# Patient Record
Sex: FEMALE | Race: WHITE | NOT HISPANIC OR LATINO | Employment: OTHER | URBAN - METROPOLITAN AREA
[De-identification: names, ages, dates, MRNs, and addresses within clinical notes are randomized per-mention and may not be internally consistent; named-entity substitution may affect disease eponyms.]

---

## 2023-09-20 PROBLEM — E04.9 SUBSTERNAL THYROID GOITER: Status: ACTIVE | Noted: 2023-09-20

## 2023-09-20 PROBLEM — M65.341 TRIGGER RING FINGER OF RIGHT HAND: Status: ACTIVE | Noted: 2023-09-20

## 2023-09-20 PROBLEM — M54.16 LUMBAR RADICULOPATHY: Status: ACTIVE | Noted: 2023-09-20

## 2023-09-20 PROBLEM — M70.62 TROCHANTERIC BURSITIS OF LEFT HIP: Status: ACTIVE | Noted: 2023-09-20

## 2023-09-20 RX ORDER — ALLOPURINOL 100 MG/1
100 TABLET ORAL DAILY
COMMUNITY
Start: 2014-01-27 | End: 2024-05-29 | Stop reason: SDUPTHER

## 2023-09-20 RX ORDER — CANDESARTAN 16 MG/1
TABLET ORAL
COMMUNITY
Start: 2017-05-09 | End: 2024-03-11 | Stop reason: SDUPTHER

## 2023-09-20 RX ORDER — ACETAMINOPHEN 325 MG/1
650 TABLET ORAL EVERY 6 HOURS PRN
COMMUNITY
Start: 2018-07-25

## 2023-09-20 RX ORDER — LEVOTHYROXINE SODIUM 50 UG/1
TABLET ORAL
COMMUNITY
End: 2024-05-13 | Stop reason: SDUPTHER

## 2023-09-20 RX ORDER — NEBIVOLOL 5 MG/1
TABLET ORAL
COMMUNITY
Start: 2017-05-12

## 2023-09-20 RX ORDER — AMLODIPINE BESYLATE 5 MG/1
5 TABLET ORAL DAILY
COMMUNITY
Start: 2014-01-27 | End: 2024-05-29 | Stop reason: SDUPTHER

## 2023-09-20 RX ORDER — ERGOCALCIFEROL 1.25 MG/1
CAPSULE ORAL
COMMUNITY

## 2023-09-20 RX ORDER — EZETIMIBE 10 MG/1
TABLET ORAL
COMMUNITY
Start: 2014-01-27

## 2023-09-20 RX ORDER — EPINEPHRINE 0.22MG
AEROSOL WITH ADAPTER (ML) INHALATION
COMMUNITY

## 2023-09-20 RX ORDER — CELECOXIB 200 MG/1
CAPSULE ORAL
COMMUNITY
Start: 2021-09-01

## 2023-09-20 RX ORDER — UBIDECARENONE 75 MG
CAPSULE ORAL
COMMUNITY

## 2023-09-20 RX ORDER — CANDESARTAN 8 MG/1
8 TABLET ORAL DAILY
COMMUNITY
Start: 2014-01-27 | End: 2024-03-11 | Stop reason: WASHOUT

## 2023-09-20 RX ORDER — FOLIC ACID 0.8 MG
TABLET ORAL
COMMUNITY

## 2023-10-20 ENCOUNTER — OFFICE VISIT (OUTPATIENT)
Dept: ORTHOPEDIC SURGERY | Facility: CLINIC | Age: 88
End: 2023-10-20
Payer: MEDICARE

## 2023-10-20 DIAGNOSIS — M12.811 ROTATOR CUFF ARTHROPATHY OF RIGHT SHOULDER: Primary | ICD-10-CM

## 2023-10-20 DIAGNOSIS — M25.512 PAIN OF BOTH SHOULDER JOINTS: ICD-10-CM

## 2023-10-20 DIAGNOSIS — M25.511 PAIN OF BOTH SHOULDER JOINTS: ICD-10-CM

## 2023-10-20 PROCEDURE — 99213 OFFICE O/P EST LOW 20 MIN: CPT | Performed by: EMERGENCY MEDICINE

## 2023-10-20 RX ORDER — TRIAMCINOLONE ACETONIDE 40 MG/ML
80 INJECTION, SUSPENSION INTRA-ARTICULAR; INTRAMUSCULAR
Status: COMPLETED | OUTPATIENT
Start: 2023-10-20 | End: 2023-10-20

## 2023-10-20 RX ORDER — LIDOCAINE HYDROCHLORIDE 10 MG/ML
3 INJECTION INFILTRATION; PERINEURAL
Status: COMPLETED | OUTPATIENT
Start: 2023-10-20 | End: 2023-10-20

## 2023-10-20 RX ADMIN — LIDOCAINE HYDROCHLORIDE 3 ML: 10 INJECTION INFILTRATION; PERINEURAL at 12:05

## 2023-10-20 RX ADMIN — TRIAMCINOLONE ACETONIDE 80 MG: 40 INJECTION, SUSPENSION INTRA-ARTICULAR; INTRAMUSCULAR at 12:05

## 2023-10-20 NOTE — PROGRESS NOTES
Subjective   Dorothea Huang is a 88 y.o. female who presents for Follow-up and Pain of the Left Shoulder (Glenohumeral inj DOLI: 7/27/23//) and Follow-up and Pain of the Right Shoulder (Subacromial inj DOLI: 7/27/23/)    HPI  10/20/23: Patient returns today for bilateral shoulder pain.  During her last visit on 7/27/2023, we did perform a right subacromial injection and a left glenohumeral joint injection.  She feels that the right subacromial injection was quite beneficial for her up until recently.  She would like to have a repeat injection performed today.  Her left shoulder remains pain-free at this time.    7/27/23: Patient returns today for bilateral shoulder pain.  During her last visit on 5/8/2023, we performed a right subacromial injection.  She felt that these worked quite well for her up until recently.  She would like to have repeat injections performed today.  She denies any further injuries.  She has no additional complaints this time.    5/8/23: Patient returns today for bilateral shoulder pain.  We did perform a right shoulder subacromial injection on 1/23/2023 that she felt worked better for her right shoulder than the glenohumeral joint injection.  She states the left shoulder glenohumeral joint injection she received on 1/9/2023 worked quite well up until recently.  She would like to have both shoulders injected today.    1/23/23: Patient returns today for right shoulder pain.  We did perform bilateral shoulder glenohumeral joint injections on 1/9/2023.  She feels that her left shoulder is doing well.  However, she continues to have superior shoulder pain.  This pain is exacerbated by laying on her shoulder, eating objects, and overhead activities.  She would like to discuss potential further treatment options.    1/9/23: Patient turns today for bilateral shoulder pain.  She states the previous injections she received on 10/17/2022 worked quite well up until recently.  She presents today  requesting repeat injections.  She denies any further injuries.  She has no additional complaints at this time.    10/17/22: Patient returns today for bilateral shoulder pain.  She states the previous injections she received on 7/14/2022 worked quite well for her up until recently.  She presents today requesting repeat injections.  She denies any additional injuries.    7/14/22: Patient returns today for reevaluation of right shoulder pain, as well as new complaint of left shoulder pain.  During her last visit on 2/14/2022, we did perform a right shoulder glenohumeral injection which worked quite well for her up until recently.  She would like to have a repeat injection today.  Furthermore, she is also developing worsening left shoulder pain for the past few weeks as well.  She did have left shoulder injection performed on 11/19/2021.  She would like a left shoulder injection today.    2/14/22: 86-year-old female presents today with complaint of generalized and superior right shoulder pain that she has had for right sometime, however exacerbated after an injury in April 2021.  She has been having therapeutic corticosteroid injections with Dr. Gutierrez.  She presents today requesting a repeat injection.  She denies any further injuries.  ROS: All pertinent positive symptoms are included in the history of present illness.    All other systems have been reviewed and are negative and noncontributory to this patient's current ailments.    Objective     There were no vitals filed for this visit.    Physical Exam    General/Constitutional: No apparent distress. Well-nourished and well developed.  Head: Normocephalic, Atraumatic.   Eyes: EOMI.  Vascular: No edema, swelling or tenderness, except as noted in detailed exam.  Respiratory: Non-labored breathing.  Integumentary: No impressive skin lesions present, except as noted in detailed exam.  Neurological: Alert and oriented.  Psychological:  Normal mood and  affect.  Musculoskeletal: Normal, except as noted in detailed exam.  Right shoulder: No rash.  No deformity.  No erythema.  Generalized pain with passive range of motion throughout all planes.  Positive empty can.  Positive Neer.  Muscle strength 4 out of 5 with forward flexion and external rotation.    Patient ID: Dorothea Huang is a 88 y.o. female.    M Inj/Asp on 10/20/2023 12:05 PM  Indications: pain  Details: 25 G needle, ultrasound-guided lateral approach  Medications: 80 mg triamcinolone acetonide 40 mg/mL; 3 mL lidocaine 10 mg/mL (1 %)  Outcome: tolerated well, no immediate complications  Procedure, treatment alternatives, risks and benefits explained, specific risks discussed. Consent was given by the patient. Immediately prior to procedure a time out was called to verify the correct patient, procedure, equipment, support staff and site/side marked as required. Patient was prepped and draped in the usual sterile fashion.           Assessment/Plan   Problem List Items Addressed This Visit    None  Visit Diagnoses       Rotator cuff arthropathy of right shoulder    -  Primary    Pain of both shoulder joints                Discussed risks versus benefits of having injection performed. Patient has elected to proceed with procedure. Please refer to procedure note above. Discussed with patient to limit overhead and lifting activities over the next 24-48 hours, they can then progress to full activities as tolerated. Discussed with patient to avoid water submersion over the next 2 days. Discussed with patient to call me immediately if they develop worsening pain, rash, erythema, or fevers. Patient should follow-up as needed if pain persists or worsens.    Oliverio Valdez, DO  Sports Medicine  [facility]     ** Please excuse any errors in grammar or translation related to this dictation. Voice recognition software was utilized to prepare this document. **

## 2023-12-04 ENCOUNTER — LAB (OUTPATIENT)
Dept: LAB | Facility: LAB | Age: 88
End: 2023-12-04
Payer: MEDICARE

## 2023-12-04 DIAGNOSIS — R73.09 ELEVATED GLUCOSE: ICD-10-CM

## 2023-12-04 DIAGNOSIS — E55.9 VITAMIN D DEFICIENCY: ICD-10-CM

## 2023-12-04 DIAGNOSIS — N18.9 CHRONIC KIDNEY DISEASE, UNSPECIFIED CKD STAGE: ICD-10-CM

## 2023-12-04 DIAGNOSIS — R82.998 LEUKOCYTES IN URINE: ICD-10-CM

## 2023-12-04 DIAGNOSIS — E03.9 HYPOTHYROIDISM, UNSPECIFIED TYPE: ICD-10-CM

## 2023-12-04 DIAGNOSIS — I10 ESSENTIAL HYPERTENSION, BENIGN: ICD-10-CM

## 2023-12-04 DIAGNOSIS — E78.5 HYPERLIPIDEMIA, UNSPECIFIED HYPERLIPIDEMIA TYPE: ICD-10-CM

## 2023-12-04 LAB
25(OH)D3 SERPL-MCNC: 45 NG/ML (ref 30–100)
ALBUMIN SERPL BCP-MCNC: 4.1 G/DL (ref 3.4–5)
ALP SERPL-CCNC: 49 U/L (ref 33–136)
ALT SERPL W P-5'-P-CCNC: 19 U/L (ref 7–45)
ANION GAP SERPL CALC-SCNC: 15 MMOL/L (ref 10–20)
APPEARANCE UR: CLEAR
AST SERPL W P-5'-P-CCNC: 19 U/L (ref 9–39)
BILIRUB SERPL-MCNC: 0.7 MG/DL (ref 0–1.2)
BILIRUB UR STRIP.AUTO-MCNC: NEGATIVE MG/DL
BUN SERPL-MCNC: 29 MG/DL (ref 6–23)
CALCIUM SERPL-MCNC: 9.5 MG/DL (ref 8.6–10.6)
CHLORIDE SERPL-SCNC: 105 MMOL/L (ref 98–107)
CHOLEST SERPL-MCNC: 176 MG/DL (ref 0–199)
CHOLESTEROL/HDL RATIO: 2.9
CO2 SERPL-SCNC: 23 MMOL/L (ref 21–32)
COLOR UR: YELLOW
CREAT SERPL-MCNC: 1.31 MG/DL (ref 0.5–1.05)
ERYTHROCYTE [DISTWIDTH] IN BLOOD BY AUTOMATED COUNT: 14 % (ref 11.5–14.5)
EST. AVERAGE GLUCOSE BLD GHB EST-MCNC: 117 MG/DL
GFR SERPL CREATININE-BSD FRML MDRD: 40 ML/MIN/1.73M*2
GLUCOSE SERPL-MCNC: 109 MG/DL (ref 74–99)
GLUCOSE UR STRIP.AUTO-MCNC: NEGATIVE MG/DL
HBA1C MFR BLD: 5.7 %
HCT VFR BLD AUTO: 40.4 % (ref 36–46)
HDLC SERPL-MCNC: 60.2 MG/DL
HGB BLD-MCNC: 12.9 G/DL (ref 12–16)
HYALINE CASTS #/AREA URNS AUTO: ABNORMAL /LPF
KETONES UR STRIP.AUTO-MCNC: NEGATIVE MG/DL
LDLC SERPL CALC-MCNC: 95 MG/DL
LEUKOCYTE ESTERASE UR QL STRIP.AUTO: ABNORMAL
MCH RBC QN AUTO: 32.6 PG (ref 26–34)
MCHC RBC AUTO-ENTMCNC: 31.9 G/DL (ref 32–36)
MCV RBC AUTO: 102 FL (ref 80–100)
NITRITE UR QL STRIP.AUTO: NEGATIVE
NON HDL CHOLESTEROL: 116 MG/DL (ref 0–149)
NRBC BLD-RTO: 0 /100 WBCS (ref 0–0)
PH UR STRIP.AUTO: 5 [PH]
PLATELET # BLD AUTO: 211 X10*3/UL (ref 150–450)
POTASSIUM SERPL-SCNC: 4.5 MMOL/L (ref 3.5–5.3)
PROT SERPL-MCNC: 6.4 G/DL (ref 6.4–8.2)
PROT UR STRIP.AUTO-MCNC: NEGATIVE MG/DL
RBC # BLD AUTO: 3.96 X10*6/UL (ref 4–5.2)
RBC # UR STRIP.AUTO: NEGATIVE /UL
RBC #/AREA URNS AUTO: ABNORMAL /HPF
SODIUM SERPL-SCNC: 138 MMOL/L (ref 136–145)
SP GR UR STRIP.AUTO: 1.02
SQUAMOUS #/AREA URNS AUTO: ABNORMAL /HPF
T4 FREE SERPL-MCNC: 1.24 NG/DL (ref 0.78–1.48)
TRIGL SERPL-MCNC: 103 MG/DL (ref 0–149)
TSH SERPL-ACNC: 2.29 MIU/L (ref 0.44–3.98)
URATE SERPL-MCNC: 4.2 MG/DL (ref 2.3–6.7)
UROBILINOGEN UR STRIP.AUTO-MCNC: <2 MG/DL
VLDL: 21 MG/DL (ref 0–40)
WBC # BLD AUTO: 6.8 X10*3/UL (ref 4.4–11.3)
WBC #/AREA URNS AUTO: ABNORMAL /HPF

## 2023-12-04 PROCEDURE — 84550 ASSAY OF BLOOD/URIC ACID: CPT

## 2023-12-04 PROCEDURE — 84439 ASSAY OF FREE THYROXINE: CPT

## 2023-12-04 PROCEDURE — 80061 LIPID PANEL: CPT

## 2023-12-04 PROCEDURE — 85027 COMPLETE CBC AUTOMATED: CPT

## 2023-12-04 PROCEDURE — 83036 HEMOGLOBIN GLYCOSYLATED A1C: CPT

## 2023-12-04 PROCEDURE — 82306 VITAMIN D 25 HYDROXY: CPT

## 2023-12-04 PROCEDURE — 81001 URINALYSIS AUTO W/SCOPE: CPT

## 2023-12-04 PROCEDURE — 87086 URINE CULTURE/COLONY COUNT: CPT

## 2023-12-04 PROCEDURE — 80053 COMPREHEN METABOLIC PANEL: CPT

## 2023-12-04 PROCEDURE — 36415 COLL VENOUS BLD VENIPUNCTURE: CPT

## 2023-12-04 PROCEDURE — 84443 ASSAY THYROID STIM HORMONE: CPT

## 2023-12-05 LAB — BACTERIA UR CULT: NORMAL

## 2024-01-02 DIAGNOSIS — K59.00 CONSTIPATION, UNSPECIFIED CONSTIPATION TYPE: Primary | ICD-10-CM

## 2024-01-11 ENCOUNTER — APPOINTMENT (OUTPATIENT)
Dept: ORTHOPEDIC SURGERY | Facility: HOSPITAL | Age: 89
End: 2024-01-11
Payer: MEDICARE

## 2024-01-18 ENCOUNTER — OFFICE VISIT (OUTPATIENT)
Dept: ORTHOPEDIC SURGERY | Facility: HOSPITAL | Age: 89
End: 2024-01-18
Payer: MEDICARE

## 2024-01-18 DIAGNOSIS — M25.511 PAIN OF BOTH SHOULDER JOINTS: Primary | ICD-10-CM

## 2024-01-18 DIAGNOSIS — M25.512 PAIN OF BOTH SHOULDER JOINTS: Primary | ICD-10-CM

## 2024-01-18 PROCEDURE — 2500000005 HC RX 250 GENERAL PHARMACY W/O HCPCS: Performed by: EMERGENCY MEDICINE

## 2024-01-18 PROCEDURE — 99213 OFFICE O/P EST LOW 20 MIN: CPT | Performed by: EMERGENCY MEDICINE

## 2024-01-18 PROCEDURE — 20611 DRAIN/INJ JOINT/BURSA W/US: CPT | Performed by: EMERGENCY MEDICINE

## 2024-01-18 PROCEDURE — 2500000004 HC RX 250 GENERAL PHARMACY W/ HCPCS (ALT 636 FOR OP/ED): Performed by: EMERGENCY MEDICINE

## 2024-01-18 RX ORDER — LIDOCAINE HYDROCHLORIDE 10 MG/ML
3 INJECTION INFILTRATION; PERINEURAL
Status: COMPLETED | OUTPATIENT
Start: 2024-01-18 | End: 2024-01-18

## 2024-01-18 RX ORDER — TRIAMCINOLONE ACETONIDE 40 MG/ML
40 INJECTION, SUSPENSION INTRA-ARTICULAR; INTRAMUSCULAR
Status: COMPLETED | OUTPATIENT
Start: 2024-01-18 | End: 2024-01-18

## 2024-01-18 RX ADMIN — LIDOCAINE HYDROCHLORIDE 3 ML: 10 INJECTION, SOLUTION INFILTRATION; PERINEURAL at 14:58

## 2024-01-18 RX ADMIN — TRIAMCINOLONE ACETONIDE 40 MG: 200 INJECTION, SUSPENSION INTRA-ARTICULAR; INTRAMUSCULAR at 14:58

## 2024-01-18 NOTE — PROGRESS NOTES
Subjective   Dorothea Huang is a 88 y.o. female who presents for Follow-up and Pain of the Left Shoulder and Follow-up and Pain of the Right Shoulder    Left Shoulder     Right Shoulder       1/18/24: Patient returns today for bilateral shoulder pain.  Her last left shoulder glenohumeral joint injection was performed on 7/27/2023 and her last right shoulder subacromial injection was performed on 10/20/2023.  She felt that the subacromial injection did not provide significant relief.    10/20/23: Patient returns today for bilateral shoulder pain.  During her last visit on 7/27/2023, we did perform a right subacromial injection and a left glenohumeral joint injection.  She feels that the right subacromial injection was quite beneficial for her up until recently.  She would like to have a repeat injection performed today.  Her left shoulder remains pain-free at this time.    7/27/23: Patient returns today for bilateral shoulder pain.  During her last visit on 5/8/2023, we performed a right subacromial injection.  She felt that these worked quite well for her up until recently.  She would like to have repeat injections performed today.  She denies any further injuries.  She has no additional complaints this time.    5/8/23: Patient returns today for bilateral shoulder pain.  We did perform a right shoulder subacromial injection on 1/23/2023 that she felt worked better for her right shoulder than the glenohumeral joint injection.  She states the left shoulder glenohumeral joint injection she received on 1/9/2023 worked quite well up until recently.  She would like to have both shoulders injected today.    1/23/23: Patient returns today for right shoulder pain.  We did perform bilateral shoulder glenohumeral joint injections on 1/9/2023.  She feels that her left shoulder is doing well.  However, she continues to have superior shoulder pain.  This pain is exacerbated by laying on her shoulder, eating objects, and overhead  activities.  She would like to discuss potential further treatment options.    1/9/23: Patient turns today for bilateral shoulder pain.  She states the previous injections she received on 10/17/2022 worked quite well up until recently.  She presents today requesting repeat injections.  She denies any further injuries.  She has no additional complaints at this time.    10/17/22: Patient returns today for bilateral shoulder pain.  She states the previous injections she received on 7/14/2022 worked quite well for her up until recently.  She presents today requesting repeat injections.  She denies any additional injuries.    7/14/22: Patient returns today for reevaluation of right shoulder pain, as well as new complaint of left shoulder pain.  During her last visit on 2/14/2022, we did perform a right shoulder glenohumeral injection which worked quite well for her up until recently.  She would like to have a repeat injection today.  Furthermore, she is also developing worsening left shoulder pain for the past few weeks as well.  She did have left shoulder injection performed on 11/19/2021.  She would like a left shoulder injection today.    2/14/22: 86-year-old female presents today with complaint of generalized and superior right shoulder pain that she has had for right sometime, however exacerbated after an injury in April 2021.  She has been having therapeutic corticosteroid injections with Dr. Gutierrez.  She presents today requesting a repeat injection.  She denies any further injuries.  ROS: All pertinent positive symptoms are included in the history of present illness.    All other systems have been reviewed and are negative and noncontributory to this patient's current ailments.    Objective     There were no vitals filed for this visit.    Physical Exam    General/Constitutional: No apparent distress. Well-nourished and well developed.  Head: Normocephalic, Atraumatic.   Eyes: EOMI.  Vascular: No edema,  swelling or tenderness, except as noted in detailed exam.  Respiratory: Non-labored breathing.  Integumentary: No impressive skin lesions present, except as noted in detailed exam.  Neurological: Alert and oriented.  Psychological:  Normal mood and affect.  Musculoskeletal: Normal, except as noted in detailed exam.  Right shoulder: No rash.  No deformity.  No erythema.  Generalized pain with passive range of motion throughout all planes.  Positive empty can.  Positive Neer.  Muscle strength 4 out of 5 with forward flexion and external rotation.  Left shoulder: No rash. No deformity. No erythema. Generalized pain with passive range of motion throughout all planes.       Patient ID: Dorothea Huang is a 88 y.o. female.    L Inj/Asp: R glenohumeral on 1/18/2024 2:58 PM  Indications: pain  Details: 25 G needle, ultrasound-guided posterior approach  Medications: 40 mg triamcinolone acetonide 40 mg/mL; 3 mL lidocaine 10 mg/mL (1 %)  Outcome: tolerated well, no immediate complications  Procedure, treatment alternatives, risks and benefits explained, specific risks discussed. Consent was given by the patient. Immediately prior to procedure a time out was called to verify the correct patient, procedure, equipment, support staff and site/side marked as required. Patient was prepped and draped in the usual sterile fashion.       L Inj/Asp: L glenohumeral on 1/18/2024 2:58 PM  Indications: pain  Details: 25 G needle, ultrasound-guided posterior approach  Medications: 40 mg triamcinolone acetonide 40 mg/mL; 3 mL lidocaine 10 mg/mL (1 %)  Outcome: tolerated well, no immediate complications  Procedure, treatment alternatives, risks and benefits explained, specific risks discussed. Consent was given by the patient. Immediately prior to procedure a time out was called to verify the correct patient, procedure, equipment, support staff and site/side marked as required. Patient was prepped and draped in the usual sterile fashion.            Assessment/Plan   Problem List Items Addressed This Visit    None  Visit Diagnoses       Pain of both shoulder joints        Relevant Orders    Point of Care Ultrasound (Completed)            Considering that she did not have long-lasting relief from the subacromial injection, I discussed with patient that we can also perform a glenohumeral joint injection for the right shoulder as well as left shoulder.  She is in agreement.  Discussed risks versus benefits of having injections performed. Patient has elected to proceed with procedures. Please refer to procedure notes above. Discussed with patient to limit overhead and lifting activities over the next 24-48 hours, they can then progress to full activities as tolerated. Discussed with patient to avoid water submersion over the next 2 days. Discussed with patient to call me immediately if they develop worsening pain, rash, erythema, or fevers. Patient should follow-up as needed if pain persist or worsens.      Oliverio Valdez, DO  Sports Medicine  [facility]     ** Please excuse any errors in grammar or translation related to this dictation. Voice recognition software was utilized to prepare this document. **

## 2024-02-13 DIAGNOSIS — F41.9 ANXIETY: Primary | ICD-10-CM

## 2024-02-13 RX ORDER — ESCITALOPRAM OXALATE 10 MG/1
10 TABLET ORAL DAILY
Qty: 30 TABLET | Refills: 2 | Status: SHIPPED | OUTPATIENT
Start: 2024-02-13 | End: 2024-05-29 | Stop reason: SDUPTHER

## 2024-03-11 DIAGNOSIS — I10 HYPERTENSION, UNSPECIFIED TYPE: Primary | ICD-10-CM

## 2024-03-11 RX ORDER — CANDESARTAN 16 MG/1
16 TABLET ORAL 2 TIMES DAILY
Qty: 180 TABLET | Refills: 3 | Status: SHIPPED | OUTPATIENT
Start: 2024-03-11

## 2024-04-12 ENCOUNTER — OFFICE VISIT (OUTPATIENT)
Dept: ORTHOPEDIC SURGERY | Facility: CLINIC | Age: 89
End: 2024-04-12
Payer: MEDICARE

## 2024-04-12 ENCOUNTER — HOSPITAL ENCOUNTER (OUTPATIENT)
Dept: RADIOLOGY | Facility: EXTERNAL LOCATION | Age: 89
Discharge: HOME | End: 2024-04-12

## 2024-04-12 DIAGNOSIS — M25.511 PAIN OF BOTH SHOULDER JOINTS: Primary | ICD-10-CM

## 2024-04-12 DIAGNOSIS — M25.512 PAIN OF BOTH SHOULDER JOINTS: Primary | ICD-10-CM

## 2024-04-12 PROCEDURE — 99214 OFFICE O/P EST MOD 30 MIN: CPT | Performed by: EMERGENCY MEDICINE

## 2024-04-12 PROCEDURE — 20611 DRAIN/INJ JOINT/BURSA W/US: CPT | Performed by: EMERGENCY MEDICINE

## 2024-04-12 RX ORDER — LIDOCAINE HYDROCHLORIDE 10 MG/ML
3 INJECTION INFILTRATION; PERINEURAL
Status: COMPLETED | OUTPATIENT
Start: 2024-04-12 | End: 2024-04-12

## 2024-04-12 RX ORDER — TRIAMCINOLONE ACETONIDE 40 MG/ML
40 INJECTION, SUSPENSION INTRA-ARTICULAR; INTRAMUSCULAR
Status: COMPLETED | OUTPATIENT
Start: 2024-04-12 | End: 2024-04-12

## 2024-04-12 RX ADMIN — LIDOCAINE HYDROCHLORIDE 3 ML: 10 INJECTION INFILTRATION; PERINEURAL at 11:36

## 2024-04-12 RX ADMIN — TRIAMCINOLONE ACETONIDE 40 MG: 40 INJECTION, SUSPENSION INTRA-ARTICULAR; INTRAMUSCULAR at 11:36

## 2024-04-12 RX ADMIN — LIDOCAINE HYDROCHLORIDE 3 ML: 10 INJECTION INFILTRATION; PERINEURAL at 11:35

## 2024-04-12 RX ADMIN — TRIAMCINOLONE ACETONIDE 40 MG: 40 INJECTION, SUSPENSION INTRA-ARTICULAR; INTRAMUSCULAR at 11:35

## 2024-04-12 NOTE — PROGRESS NOTES
Subjective   Dorothea Huang is a 88 y.o. female who presents for Follow-up and Pain of the Left Shoulder and Follow-up and Pain of the Right Shoulder    Left Shoulder     Right Shoulder       4/12/24: Patient returns today for bilateral shoulder pain.  We did perform bilateral shoulder glenohumeral joint injections for her on 1/18/2024.  She felt they worked well and her pain has since returned and has become progressively worse.  Considering this, she would like to have repeat injections performed today.  No additional complaints.    1/18/24: Patient returns today for bilateral shoulder pain.  Her last left shoulder glenohumeral joint injection was performed on 7/27/2023 and her last right shoulder subacromial injection was performed on 10/20/2023.  She felt that the subacromial injection did not provide significant relief.    10/20/23: Patient returns today for bilateral shoulder pain.  During her last visit on 7/27/2023, we did perform a right subacromial injection and a left glenohumeral joint injection.  She feels that the right subacromial injection was quite beneficial for her up until recently.  She would like to have a repeat injection performed today.  Her left shoulder remains pain-free at this time.    7/27/23: Patient returns today for bilateral shoulder pain.  During her last visit on 5/8/2023, we performed a right subacromial injection.  She felt that these worked quite well for her up until recently.  She would like to have repeat injections performed today.  She denies any further injuries.  She has no additional complaints this time.    5/8/23: Patient returns today for bilateral shoulder pain.  We did perform a right shoulder subacromial injection on 1/23/2023 that she felt worked better for her right shoulder than the glenohumeral joint injection.  She states the left shoulder glenohumeral joint injection she received on 1/9/2023 worked quite well up until recently.  She would like to have both  shoulders injected today.    1/23/23: Patient returns today for right shoulder pain.  We did perform bilateral shoulder glenohumeral joint injections on 1/9/2023.  She feels that her left shoulder is doing well.  However, she continues to have superior shoulder pain.  This pain is exacerbated by laying on her shoulder, eating objects, and overhead activities.  She would like to discuss potential further treatment options.    1/9/23: Patient turns today for bilateral shoulder pain.  She states the previous injections she received on 10/17/2022 worked quite well up until recently.  She presents today requesting repeat injections.  She denies any further injuries.  She has no additional complaints at this time.    10/17/22: Patient returns today for bilateral shoulder pain.  She states the previous injections she received on 7/14/2022 worked quite well for her up until recently.  She presents today requesting repeat injections.  She denies any additional injuries.    7/14/22: Patient returns today for reevaluation of right shoulder pain, as well as new complaint of left shoulder pain.  During her last visit on 2/14/2022, we did perform a right shoulder glenohumeral injection which worked quite well for her up until recently.  She would like to have a repeat injection today.  Furthermore, she is also developing worsening left shoulder pain for the past few weeks as well.  She did have left shoulder injection performed on 11/19/2021.  She would like a left shoulder injection today.    2/14/22: 86-year-old female presents today with complaint of generalized and superior right shoulder pain that she has had for right sometime, however exacerbated after an injury in April 2021.  She has been having therapeutic corticosteroid injections with Dr. Gutierrez.  She presents today requesting a repeat injection.  She denies any further injuries.  ROS: All pertinent positive symptoms are included in the history of present  illness.    All other systems have been reviewed and are negative and noncontributory to this patient's current ailments.    Objective     There were no vitals filed for this visit.    Physical Exam    General/Constitutional: No apparent distress. Well-nourished and well developed.  Head: Normocephalic, Atraumatic.   Eyes: EOMI.  Vascular: No edema, swelling or tenderness, except as noted in detailed exam.  Respiratory: Non-labored breathing.  Integumentary: No impressive skin lesions present, except as noted in detailed exam.  Neurological: Alert and oriented.  Psychological:  Normal mood and affect.  Musculoskeletal: Normal, except as noted in detailed exam.  Right shoulder: No rash.  No deformity.  No erythema.  Generalized pain with passive range of motion throughout all planes.  Positive empty can.  Positive Neer.  Muscle strength 4 out of 5 with forward flexion and external rotation.  Left shoulder: No rash. No deformity. No erythema. Generalized pain with passive range of motion throughout all planes.       Patient ID: Dorothea Huang is a 88 y.o. female.    L Inj/Asp: R glenohumeral on 4/12/2024 11:35 AM  Indications: pain  Details: 25 G needle, ultrasound-guided posterior approach  Medications: 40 mg triamcinolone acetonide 40 mg/mL; 3 mL lidocaine 10 mg/mL (1 %)  Outcome: tolerated well, no immediate complications  Procedure, treatment alternatives, risks and benefits explained, specific risks discussed. Consent was given by the patient. Immediately prior to procedure a time out was called to verify the correct patient, procedure, equipment, support staff and site/side marked as required. Patient was prepped and draped in the usual sterile fashion.       L Inj/Asp: L glenohumeral on 4/12/2024 11:36 AM  Indications: pain  Details: 25 G needle, ultrasound-guided posterior approach  Medications: 40 mg triamcinolone acetonide 40 mg/mL; 3 mL lidocaine 10 mg/mL (1 %)  Outcome: tolerated well, no immediate  complications  Procedure, treatment alternatives, risks and benefits explained, specific risks discussed. Consent was given by the patient. Immediately prior to procedure a time out was called to verify the correct patient, procedure, equipment, support staff and site/side marked as required. Patient was prepped and draped in the usual sterile fashion.           Assessment/Plan   Problem List Items Addressed This Visit    None  Visit Diagnoses       Pain of both shoulder joints        Relevant Orders    Point of Care Ultrasound (Completed)            Considering that she did well with previous injections, I do feel that repeat injections are warranted today.  Discussed risks versus benefits of having injections performed. Patient has elected to proceed with procedures. Please refer to procedure notes above. Discussed with patient to limit overhead and lifting activities over the next 24-48 hours, they can then progress to full activities as tolerated. Discussed with patient to avoid water submersion over the next 2 days. Discussed with patient to call me immediately if they develop worsening pain, rash, erythema, or fevers. Patient should follow-up as needed if pain persist or worsens.      Oliverio Valdez, DO  Sports Medicine  [facility]     ** Please excuse any errors in grammar or translation related to this dictation. Voice recognition software was utilized to prepare this document. **

## 2024-04-29 DIAGNOSIS — I10 HYPERTENSION, UNSPECIFIED TYPE: Primary | ICD-10-CM

## 2024-04-29 RX ORDER — METOPROLOL SUCCINATE 50 MG/1
50 TABLET, EXTENDED RELEASE ORAL DAILY
Qty: 90 TABLET | Refills: 3 | Status: SHIPPED | OUTPATIENT
Start: 2024-04-29

## 2024-04-29 RX ORDER — METOPROLOL SUCCINATE 50 MG/1
50 TABLET, EXTENDED RELEASE ORAL DAILY
COMMUNITY
End: 2024-04-29 | Stop reason: SDUPTHER

## 2024-05-13 DIAGNOSIS — E05.90 HYPERTHYROIDISM: Primary | ICD-10-CM

## 2024-05-13 RX ORDER — LEVOTHYROXINE SODIUM 50 UG/1
50 TABLET ORAL
Qty: 90 TABLET | Refills: 3 | Status: SHIPPED | OUTPATIENT
Start: 2024-05-13

## 2024-05-29 DIAGNOSIS — I27.82 CHRONIC PULMONARY EMBOLISM, UNSPECIFIED PULMONARY EMBOLISM TYPE, UNSPECIFIED WHETHER ACUTE COR PULMONALE PRESENT (MULTI): ICD-10-CM

## 2024-05-29 DIAGNOSIS — I10 HYPERTENSION, UNSPECIFIED TYPE: ICD-10-CM

## 2024-05-29 DIAGNOSIS — F41.9 ANXIETY: ICD-10-CM

## 2024-05-29 DIAGNOSIS — M10.9 GOUT, UNSPECIFIED CAUSE, UNSPECIFIED CHRONICITY, UNSPECIFIED SITE: Primary | ICD-10-CM

## 2024-05-29 RX ORDER — AMLODIPINE BESYLATE 5 MG/1
5 TABLET ORAL 2 TIMES DAILY
Qty: 180 TABLET | Refills: 3 | Status: SHIPPED | OUTPATIENT
Start: 2024-05-29

## 2024-05-29 RX ORDER — ALLOPURINOL 100 MG/1
100 TABLET ORAL 2 TIMES DAILY
Qty: 180 TABLET | Refills: 3 | Status: SHIPPED | OUTPATIENT
Start: 2024-05-29

## 2024-05-29 RX ORDER — ESCITALOPRAM OXALATE 10 MG/1
10 TABLET ORAL DAILY
Qty: 90 TABLET | Refills: 3 | Status: SHIPPED | OUTPATIENT
Start: 2024-05-29 | End: 2025-05-24

## 2024-06-24 ENCOUNTER — APPOINTMENT (OUTPATIENT)
Dept: ORTHOPEDIC SURGERY | Facility: CLINIC | Age: 89
End: 2024-06-24
Payer: MEDICARE

## 2024-06-24 VITALS — HEIGHT: 60 IN | BODY MASS INDEX: 45.94 KG/M2 | WEIGHT: 234 LBS

## 2024-06-24 DIAGNOSIS — M65.30 TRIGGER FINGER, ACQUIRED: Primary | ICD-10-CM

## 2024-06-24 RX ORDER — LIDOCAINE HYDROCHLORIDE 10 MG/ML
0.5 INJECTION INFILTRATION; PERINEURAL
Status: COMPLETED | OUTPATIENT
Start: 2024-06-24 | End: 2024-06-24

## 2024-06-24 RX ORDER — TRIAMCINOLONE ACETONIDE 40 MG/ML
20 INJECTION, SUSPENSION INTRA-ARTICULAR; INTRAMUSCULAR
Status: COMPLETED | OUTPATIENT
Start: 2024-06-24 | End: 2024-06-24

## 2024-06-24 ASSESSMENT — PAIN DESCRIPTION - DESCRIPTORS: DESCRIPTORS: SORE

## 2024-06-24 ASSESSMENT — PAIN SCALES - GENERAL: PAINLEVEL_OUTOF10: 3

## 2024-06-24 ASSESSMENT — PAIN - FUNCTIONAL ASSESSMENT: PAIN_FUNCTIONAL_ASSESSMENT: 0-10

## 2024-06-24 NOTE — PROGRESS NOTES
Dorothea returns to clinic today for recurrent triggering of her right middle finger.  Juan Miguel was seen last year for the same issue at that time given a steroid injection and did very well with it.  She is not interested in any surgery at this time requesting a repeat injection.  Has noticed recurrence of her symptoms over the last few weeks.    Patient's self reported past medical history, medications, allergies, surgical history, family and social history as well as a 10 point review of systems has been documented in the new patient intake form and scanned into the patient's electronic medical record. Pertinent findings are documented in the HPI.    Physical Examination Findings:  Constitutional: Appears well-developed and well-nourished.  Head: Normocephalic and atraumatic.  Eyes: Pupils are equal and round.  Cardiovascular: Intact distal pulses.   Respiratory: Effort normal. No respiratory distress.  Neurologic: Alert and oriented to person, place, and time.  Skin: Skin is warm and dry.  Hematologic / Lymphatic: No lymphedema, lymphangitis.  Psychiatric: normal mood and affect. Behavior is normal.   Musculoskeletal: Pain with palpation over the A1 pulley of the right middle finger.  She does not want to reproduce any triggering given the discomfort.  Fingers are warm and well-perfused.    Impression: Right middle finger trigger finger    Plan: Patient is aware of this condition as well as treatment options she is requesting a repeat steroid injection which was performed for her today.  She will continue to monitor symptoms she is not interested in surgical intervention.  She will follow-up with our office as needed.    Patient ID: Dorothea Huang is a 89 y.o. female.    Hand / UE Inj/Asp: R long A1 for trigger finger on 6/24/2024 11:21 AM  Details: 25 G needle  Medications: 20 mg triamcinolone acetonide 40 mg/mL; 0.5 mL lidocaine 10 mg/mL (1 %)  Procedure, treatment alternatives, risks and benefits explained,  specific risks discussed. Immediately prior to procedure a time out was called to verify the correct patient, procedure, equipment, support staff and site/side marked as required.         Cecilia Velazquez PA-C  Department of Orthopaedic Surgery  [facility]    Dictation performed with the use of voice recognition software. Syntax and grammatical errors may exist.

## 2024-07-19 ENCOUNTER — APPOINTMENT (OUTPATIENT)
Dept: ORTHOPEDIC SURGERY | Facility: CLINIC | Age: 89
End: 2024-07-19
Payer: MEDICARE

## 2024-07-19 ENCOUNTER — HOSPITAL ENCOUNTER (OUTPATIENT)
Dept: RADIOLOGY | Facility: EXTERNAL LOCATION | Age: 89
Discharge: HOME | End: 2024-07-19

## 2024-07-19 DIAGNOSIS — M25.511 PAIN OF BOTH SHOULDER JOINTS: Primary | ICD-10-CM

## 2024-07-19 DIAGNOSIS — M25.512 PAIN OF BOTH SHOULDER JOINTS: Primary | ICD-10-CM

## 2024-07-19 PROCEDURE — 99214 OFFICE O/P EST MOD 30 MIN: CPT | Performed by: EMERGENCY MEDICINE

## 2024-07-19 PROCEDURE — 20610 DRAIN/INJ JOINT/BURSA W/O US: CPT | Performed by: EMERGENCY MEDICINE

## 2024-07-19 RX ORDER — TRIAMCINOLONE ACETONIDE 40 MG/ML
40 INJECTION, SUSPENSION INTRA-ARTICULAR; INTRAMUSCULAR
Status: COMPLETED | OUTPATIENT
Start: 2024-07-19 | End: 2024-07-19

## 2024-07-19 RX ORDER — LIDOCAINE HYDROCHLORIDE 10 MG/ML
3 INJECTION INFILTRATION; PERINEURAL
Status: COMPLETED | OUTPATIENT
Start: 2024-07-19 | End: 2024-07-19

## 2024-07-19 NOTE — PROGRESS NOTES
Subjective   Dorothea Huang is a 89 y.o. female who presents for Follow-up of the Left Shoulder and Follow-up of the Right Shoulder    Left Shoulder     Right Shoulder     7/19/24: Patient returns today for bilateral shoulder pain.  We did perform bilateral shoulder glenohumeral joint injections for her on 4/12/24.  She felt they worked well and her pain has since returned and has become progressively worse.  Considering this, she would like to have repeat injections performed today.  No additional complaints.    4/12/24: Patient returns today for bilateral shoulder pain.  We did perform bilateral shoulder glenohumeral joint injections for her on 1/18/2024.  She felt they worked well and her pain has since returned and has become progressively worse.  Considering this, she would like to have repeat injections performed today.  No additional complaints.    1/18/24: Patient returns today for bilateral shoulder pain.  Her last left shoulder glenohumeral joint injection was performed on 7/27/2023 and her last right shoulder subacromial injection was performed on 10/20/2023.  She felt that the subacromial injection did not provide significant relief.    10/20/23: Patient returns today for bilateral shoulder pain.  During her last visit on 7/27/2023, we did perform a right subacromial injection and a left glenohumeral joint injection.  She feels that the right subacromial injection was quite beneficial for her up until recently.  She would like to have a repeat injection performed today.  Her left shoulder remains pain-free at this time.    7/27/23: Patient returns today for bilateral shoulder pain.  During her last visit on 5/8/2023, we performed a right subacromial injection.  She felt that these worked quite well for her up until recently.  She would like to have repeat injections performed today.  She denies any further injuries.  She has no additional complaints this time.    5/8/23: Patient returns today for  bilateral shoulder pain.  We did perform a right shoulder subacromial injection on 1/23/2023 that she felt worked better for her right shoulder than the glenohumeral joint injection.  She states the left shoulder glenohumeral joint injection she received on 1/9/2023 worked quite well up until recently.  She would like to have both shoulders injected today.    1/23/23: Patient returns today for right shoulder pain.  We did perform bilateral shoulder glenohumeral joint injections on 1/9/2023.  She feels that her left shoulder is doing well.  However, she continues to have superior shoulder pain.  This pain is exacerbated by laying on her shoulder, eating objects, and overhead activities.  She would like to discuss potential further treatment options.    1/9/23: Patient turns today for bilateral shoulder pain.  She states the previous injections she received on 10/17/2022 worked quite well up until recently.  She presents today requesting repeat injections.  She denies any further injuries.  She has no additional complaints at this time.    10/17/22: Patient returns today for bilateral shoulder pain.  She states the previous injections she received on 7/14/2022 worked quite well for her up until recently.  She presents today requesting repeat injections.  She denies any additional injuries.    7/14/22: Patient returns today for reevaluation of right shoulder pain, as well as new complaint of left shoulder pain.  During her last visit on 2/14/2022, we did perform a right shoulder glenohumeral injection which worked quite well for her up until recently.  She would like to have a repeat injection today.  Furthermore, she is also developing worsening left shoulder pain for the past few weeks as well.  She did have left shoulder injection performed on 11/19/2021.  She would like a left shoulder injection today.    2/14/22: 86-year-old female presents today with complaint of generalized and superior right shoulder pain that  she has had for right sometime, however exacerbated after an injury in April 2021.  She has been having therapeutic corticosteroid injections with Dr. Gutierrez.  She presents today requesting a repeat injection.  She denies any further injuries.  ROS: All pertinent positive symptoms are included in the history of present illness.    All other systems have been reviewed and are negative and noncontributory to this patient's current ailments.    Objective     There were no vitals filed for this visit.    Physical Exam    General/Constitutional: No apparent distress. Well-nourished and well developed.  Head: Normocephalic, Atraumatic.   Eyes: EOMI.  Vascular: No edema, swelling or tenderness, except as noted in detailed exam.  Respiratory: Non-labored breathing.  Integumentary: No impressive skin lesions present, except as noted in detailed exam.  Neurological: Alert and oriented.  Psychological:  Normal mood and affect.  Musculoskeletal: Normal, except as noted in detailed exam.  Right shoulder: No rash.  No deformity.  No erythema.  Generalized pain with passive range of motion throughout all planes.  Positive empty can.  Positive Neer.  Muscle strength 4 out of 5 with forward flexion and external rotation.  Left shoulder: No rash. No deformity. No erythema. Generalized pain with passive range of motion throughout all planes.       Patient ID: Dorothea Huang is a 89 y.o. female.    L Inj/Asp: R glenohumeral on 7/19/2024 11:20 AM  Indications: pain  Details: 25 G needle, ultrasound-guided posterior approach  Medications: 40 mg triamcinolone acetonide 40 mg/mL; 3 mL lidocaine 10 mg/mL (1 %)  Outcome: tolerated well, no immediate complications  Procedure, treatment alternatives, risks and benefits explained, specific risks discussed. Consent was given by the patient. Immediately prior to procedure a time out was called to verify the correct patient, procedure, equipment, support staff and site/side marked as required.  Patient was prepped and draped in the usual sterile fashion.       L Inj/Asp: L glenohumeral on 7/19/2024 11:20 AM  Indications: pain  Details: 25 G needle, ultrasound-guided posterior approach  Medications: 40 mg triamcinolone acetonide 40 mg/mL; 3 mL lidocaine 10 mg/mL (1 %)  Outcome: tolerated well, no immediate complications  Procedure, treatment alternatives, risks and benefits explained, specific risks discussed. Consent was given by the patient. Immediately prior to procedure a time out was called to verify the correct patient, procedure, equipment, support staff and site/side marked as required. Patient was prepped and draped in the usual sterile fashion.         Assessment/Plan   Problem List Items Addressed This Visit    None  Visit Diagnoses       Pain of both shoulder joints        Relevant Orders    Point of Care Ultrasound (Completed)            Considering that she did well with previous injections, I do feel that repeat injections are warranted today.  Discussed risks versus benefits of having injections performed. Patient has elected to proceed with procedures. Please refer to procedure notes above. Discussed with patient to limit overhead and lifting activities over the next 24-48 hours, they can then progress to full activities as tolerated. Discussed with patient to avoid water submersion over the next 2 days. Discussed with patient to call me immediately if they develop worsening pain, rash, erythema, or fevers. Patient should follow-up as needed if pain persist or worsens.      Oliverio Valdez, DO  Sports Medicine  [facility]     ** Please excuse any errors in grammar or translation related to this dictation. Voice recognition software was utilized to prepare this document. **

## 2024-10-11 ENCOUNTER — APPOINTMENT (OUTPATIENT)
Dept: ORTHOPEDIC SURGERY | Facility: CLINIC | Age: 89
End: 2024-10-11
Payer: MEDICARE

## 2024-10-11 ENCOUNTER — HOSPITAL ENCOUNTER (OUTPATIENT)
Dept: RADIOLOGY | Facility: EXTERNAL LOCATION | Age: 89
Discharge: HOME | End: 2024-10-11

## 2024-10-11 DIAGNOSIS — M25.511 PAIN OF BOTH SHOULDER JOINTS: Primary | ICD-10-CM

## 2024-10-11 DIAGNOSIS — M25.512 PAIN OF BOTH SHOULDER JOINTS: Primary | ICD-10-CM

## 2024-10-11 PROCEDURE — 99214 OFFICE O/P EST MOD 30 MIN: CPT | Performed by: EMERGENCY MEDICINE

## 2024-10-11 RX ORDER — LIDOCAINE HYDROCHLORIDE 10 MG/ML
3 INJECTION, SOLUTION INFILTRATION; PERINEURAL
Status: COMPLETED | OUTPATIENT
Start: 2024-10-11 | End: 2024-10-11

## 2024-10-11 RX ORDER — TRIAMCINOLONE ACETONIDE 40 MG/ML
40 INJECTION, SUSPENSION INTRA-ARTICULAR; INTRAMUSCULAR
Status: COMPLETED | OUTPATIENT
Start: 2024-10-11 | End: 2024-10-11

## 2024-10-11 NOTE — PROGRESS NOTES
Subjective   Dorothea Huang is a 89 y.o. female who presents for Follow-up of the Right Shoulder and Follow-up of the Left Shoulder    Left Shoulder     Right Shoulder       10/11/24: Patient returns today for bilateral shoulder pain.  We did perform bilateral shoulder glenohumeral joint corticosteroid injections for her on 7/19/2024.  She felt these worked quite well up until recently.  Her pain is since returned to become progressively worse.  Considering this, she would like to have repeat injections performed today.  No additional complaints.    7/19/24: Patient returns today for bilateral shoulder pain.  We did perform bilateral shoulder glenohumeral joint injections for her on 4/12/24.  She felt they worked well and her pain has since returned and has become progressively worse.  Considering this, she would like to have repeat injections performed today.  No additional complaints.    4/12/24: Patient returns today for bilateral shoulder pain.  We did perform bilateral shoulder glenohumeral joint injections for her on 1/18/2024.  She felt they worked well and her pain has since returned and has become progressively worse.  Considering this, she would like to have repeat injections performed today.  No additional complaints.    1/18/24: Patient returns today for bilateral shoulder pain.  Her last left shoulder glenohumeral joint injection was performed on 7/27/2023 and her last right shoulder subacromial injection was performed on 10/20/2023.  She felt that the subacromial injection did not provide significant relief.    10/20/23: Patient returns today for bilateral shoulder pain.  During her last visit on 7/27/2023, we did perform a right subacromial injection and a left glenohumeral joint injection.  She feels that the right subacromial injection was quite beneficial for her up until recently.  She would like to have a repeat injection performed today.  Her left shoulder remains pain-free at this  time.    7/27/23: Patient returns today for bilateral shoulder pain.  During her last visit on 5/8/2023, we performed a right subacromial injection.  She felt that these worked quite well for her up until recently.  She would like to have repeat injections performed today.  She denies any further injuries.  She has no additional complaints this time.    5/8/23: Patient returns today for bilateral shoulder pain.  We did perform a right shoulder subacromial injection on 1/23/2023 that she felt worked better for her right shoulder than the glenohumeral joint injection.  She states the left shoulder glenohumeral joint injection she received on 1/9/2023 worked quite well up until recently.  She would like to have both shoulders injected today.    1/23/23: Patient returns today for right shoulder pain.  We did perform bilateral shoulder glenohumeral joint injections on 1/9/2023.  She feels that her left shoulder is doing well.  However, she continues to have superior shoulder pain.  This pain is exacerbated by laying on her shoulder, eating objects, and overhead activities.  She would like to discuss potential further treatment options.    1/9/23: Patient turns today for bilateral shoulder pain.  She states the previous injections she received on 10/17/2022 worked quite well up until recently.  She presents today requesting repeat injections.  She denies any further injuries.  She has no additional complaints at this time.    10/17/22: Patient returns today for bilateral shoulder pain.  She states the previous injections she received on 7/14/2022 worked quite well for her up until recently.  She presents today requesting repeat injections.  She denies any additional injuries.    7/14/22: Patient returns today for reevaluation of right shoulder pain, as well as new complaint of left shoulder pain.  During her last visit on 2/14/2022, we did perform a right shoulder glenohumeral injection which worked quite well for her up  until recently.  She would like to have a repeat injection today.  Furthermore, she is also developing worsening left shoulder pain for the past few weeks as well.  She did have left shoulder injection performed on 11/19/2021.  She would like a left shoulder injection today.    2/14/22: 86-year-old female presents today with complaint of generalized and superior right shoulder pain that she has had for right sometime, however exacerbated after an injury in April 2021.  She has been having therapeutic corticosteroid injections with Dr. Gutierrez.  She presents today requesting a repeat injection.  She denies any further injuries.  ROS: All pertinent positive symptoms are included in the history of present illness.    All other systems have been reviewed and are negative and noncontributory to this patient's current ailments.    Objective     There were no vitals filed for this visit.    Physical Exam    General/Constitutional: No apparent distress. Well-nourished and well developed.  Head: Normocephalic, Atraumatic.   Eyes: EOMI.  Vascular: No edema, swelling or tenderness, except as noted in detailed exam.  Respiratory: Non-labored breathing.  Integumentary: No impressive skin lesions present, except as noted in detailed exam.  Neurological: Alert and oriented.  Psychological:  Normal mood and affect.  Musculoskeletal: Normal, except as noted in detailed exam.  Right shoulder: No rash.  No deformity.  No erythema.  Generalized pain with passive range of motion throughout all planes.  Positive empty can.  Positive Neer.  Muscle strength 4 out of 5 with forward flexion and external rotation.  Left shoulder: No rash. No deformity. No erythema. Generalized pain with passive range of motion throughout all planes.       Patient ID: Dorothea Huang is a 89 y.o. female.    L Inj/Asp: R glenohumeral on 10/11/2024 10:35 AM  Indications: pain  Details: 25 G needle, ultrasound-guided posterior approach  Medications: 40 mg  triamcinolone acetonide 40 mg/mL; 3 mL lidocaine 10 mg/mL (1 %)  Outcome: tolerated well, no immediate complications  Procedure, treatment alternatives, risks and benefits explained, specific risks discussed. Consent was given by the patient. Immediately prior to procedure a time out was called to verify the correct patient, procedure, equipment, support staff and site/side marked as required. Patient was prepped and draped in the usual sterile fashion.       L Inj/Asp: L glenohumeral on 10/11/2024 10:35 AM  Indications: pain  Details: 25 G needle, ultrasound-guided posterior approach  Medications: 40 mg triamcinolone acetonide 40 mg/mL; 3 mL lidocaine 10 mg/mL (1 %)  Outcome: tolerated well, no immediate complications  Procedure, treatment alternatives, risks and benefits explained, specific risks discussed. Consent was given by the patient. Immediately prior to procedure a time out was called to verify the correct patient, procedure, equipment, support staff and site/side marked as required. Patient was prepped and draped in the usual sterile fashion.           Assessment/Plan   Problem List Items Addressed This Visit    None  Visit Diagnoses       Pain of both shoulder joints        Relevant Orders    Point of Care Ultrasound (Completed)            Considering that she had done well with previous injections, I do feel that repeat injections are warranted today.  Discussed risks versus benefits of having injections performed. Patient has elected to proceed with procedures. Please refer to procedure notes above. Discussed with patient to limit overhead and lifting activities over the next 24-48 hours, they can then progress to full activities as tolerated. Discussed with patient to avoid water submersion over the next 2 days. Discussed with patient to call me immediately if they develop worsening pain, rash, erythema, or fevers. Patient should follow-up as needed if pain persist or worsens.      Oliverio Valdez,  DO  Sports [facility]     ** Please excuse any errors in grammar or translation related to this dictation. Voice recognition software was utilized to prepare this document. **

## 2024-11-15 ENCOUNTER — HOSPITAL ENCOUNTER (EMERGENCY)
Facility: HOSPITAL | Age: 89
Discharge: HOME | End: 2024-11-15
Attending: INTERNAL MEDICINE
Payer: MEDICARE

## 2024-11-15 ENCOUNTER — APPOINTMENT (OUTPATIENT)
Dept: RADIOLOGY | Facility: HOSPITAL | Age: 89
End: 2024-11-15
Payer: MEDICARE

## 2024-11-15 VITALS
DIASTOLIC BLOOD PRESSURE: 63 MMHG | TEMPERATURE: 97.2 F | HEIGHT: 64 IN | OXYGEN SATURATION: 98 % | SYSTOLIC BLOOD PRESSURE: 122 MMHG | BODY MASS INDEX: 30.73 KG/M2 | WEIGHT: 180 LBS | RESPIRATION RATE: 18 BRPM | HEART RATE: 67 BPM
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
## 2024-11-15 DIAGNOSIS — S61.412A SKIN TEAR OF LEFT HAND WITHOUT COMPLICATION, INITIAL ENCOUNTER: ICD-10-CM

## 2024-11-15 DIAGNOSIS — S09.90XA HEAD INJURY, INITIAL ENCOUNTER: ICD-10-CM

## 2024-11-15 PROCEDURE — 99285 EMERGENCY DEPT VISIT HI MDM: CPT | Mod: 25

## 2024-11-15 PROCEDURE — 70450 CT HEAD/BRAIN W/O DYE: CPT | Performed by: RADIOLOGY

## 2024-11-15 PROCEDURE — 72125 CT NECK SPINE W/O DYE: CPT

## 2024-11-15 PROCEDURE — 72125 CT NECK SPINE W/O DYE: CPT | Performed by: RADIOLOGY

## 2024-11-15 PROCEDURE — 70450 CT HEAD/BRAIN W/O DYE: CPT

## 2024-11-15 PROCEDURE — 2500000005 HC RX 250 GENERAL PHARMACY W/O HCPCS: Performed by: INTERNAL MEDICINE

## 2024-11-15 RX ORDER — BACITRACIN ZINC 500 UNIT/G
1 OINTMENT IN PACKET (EA) TOPICAL ONCE
Status: COMPLETED | OUTPATIENT
Start: 2024-11-15 | End: 2024-11-15

## 2024-11-15 ASSESSMENT — COLUMBIA-SUICIDE SEVERITY RATING SCALE - C-SSRS
2. HAVE YOU ACTUALLY HAD ANY THOUGHTS OF KILLING YOURSELF?: NO
1. IN THE PAST MONTH, HAVE YOU WISHED YOU WERE DEAD OR WISHED YOU COULD GO TO SLEEP AND NOT WAKE UP?: NO
6. HAVE YOU EVER DONE ANYTHING, STARTED TO DO ANYTHING, OR PREPARED TO DO ANYTHING TO END YOUR LIFE?: NO

## 2024-11-15 ASSESSMENT — PAIN SCALES - GENERAL: PAINLEVEL_OUTOF10: 0 - NO PAIN

## 2024-11-15 ASSESSMENT — PAIN DESCRIPTION - PAIN TYPE: TYPE: ACUTE PAIN

## 2024-11-15 ASSESSMENT — PAIN - FUNCTIONAL ASSESSMENT: PAIN_FUNCTIONAL_ASSESSMENT: 0-10

## 2024-11-15 NOTE — DISCHARGE INSTRUCTIONS
You were seen today for fall with head strike.  Your evaluation was not concerning for an emergency at this time. Please see the attached information sheet for information about your condition, how to care for your condition at home, and reasons to return to the emergency department. Take any prescriptions written today as prescribed. You should call your primary care provider within 24 hours to tell them about today's visit, including any new medications or medication changes, as he or she may want to see you in the office for further evaluation. If you do not have a primary care provider, call  [phone] for an appointment. We offer in-person office visits as well as virtual options. Please do not hesitate to call  [phone] or return to the emergency department with any new or unresolved concerns or symptoms. Thank you for choosing [facility] for your care.

## 2024-11-15 NOTE — ED TRIAGE NOTES
Had a fall (on Eliquis), hit her head. Denies pain. Alert and oriented. Hit her head and left hip. No other complaints.

## 2024-11-15 NOTE — ED PROVIDER NOTES
HPI     CC: Fall and on Eliquis     HPI: Dorothea Huang is a 89 y.o. female with a history of PE on Eliquis, HTN, goiter s/p partial thyroidectomy, HLD, TIA, depression, presents with a fall with head strike.  Patient states that she got tangled up in the shopping cart at [employer] and fell onto her left shoulder, hitting her head.  She states she was told that anytime she hits her head she needs to come in because she is on Eliquis.  She denies loss of consciousness or pain anywhere.  She has a skin tear to the left hand but denies any specific pain there.  She has some mild shoulder discomfort.  She is not interested in any x-rays.  She just wants to make sure her head is okay.  States her last tetanus was within the last 5 years.    ROS: 10-point review of systems was performed and is otherwise negative except as noted in HPI.    Limitations to history: N/A    Independent Historians:  at bedside    External Records Reviewed: Outpatient notes in EMR    Past Medical History: Noncontributory except per HPI     Past Surgical History: Noncontributory except per HPI     Family History: Reviewed and noncontributory     Social History:  Denies tobacco. Denies ETOH. Denies illicit drugs.    Social Determinants Affecting Care: N/A    Allergies   Allergen Reactions    Penicillins Unknown       Home Meds:   Current Outpatient Medications   Medication Instructions    acetaminophen (TYLENOL) 650 mg, oral, Every 6 hours PRN    allopurinol (ZYLOPRIM) 100 mg, oral, 2 times daily    amLODIPine (NORVASC) 5 mg, oral, 2 times daily    apixaban (ELIQUIS) 2.5 mg, oral, 2 times daily    candesartan (ATACAND) 16 mg, oral, 2 times daily    celecoxib (CeleBREX) 200 mg capsule oral    coenzyme Q-10 100 mg capsule oral    cyanocobalamin (Vitamin B-12) 500 mcg tablet oral    ergocalciferol (Vitamin D-2) 1.25 MG (81527 UT) capsule oral    escitalopram (LEXAPRO) 10 mg, oral, Daily    ezetimibe (Zetia) 10 mg tablet oral    folic acid  "(Folvite) 800 mcg tablet oral    levothyroxine (SYNTHROID) 50 mcg, oral, Daily before breakfast    linaCLOtide (LINZESS) 72 mcg, oral, Daily before breakfast, Do not crush or chew.    metoprolol succinate XL (TOPROL-XL) 50 mg, oral, Daily, Do not crush or chew.    nebivolol (Bystolic) 5 mg tablet oral    rosuvastatin calcium (ROSUVASTATIN ORAL) oral, Weekly    ubidecarenone/vitamin E (COENZYME Q10-VITAMIN E ORAL) oral        Physical Exam     ED Triage Vitals [11/15/24 1446]   Temperature Heart Rate Respirations BP   36.2 °C (97.2 °F) 55 17 133/71      Pulse Ox Temp src Heart Rate Source Patient Position   98 % -- -- Sitting      BP Location FiO2 (%)     Right arm --         Heart Rate:  [55-67]   Temperature:  [36.2 °C (97.2 °F)]   Respirations:  [17-18]   BP: (122-133)/(63-71)   Height:  [162.6 cm (5' 4\")]   Weight:  [81.6 kg (180 lb)]   Pulse Ox:  [98 %]      Physical Exam  Vitals and nursing note reviewed.     CONSTITUTIONAL: Well appearing, well nourished, in no acute distress.   HENMT: Head atraumatic. No facial or scalp TTP. Airway patent. Nasal mucosa clear. Mouth with normal mucosa, clear oropharynx. Uvula midline. TMs clear bilaterally with no hemotympanum. Neck supple.    EYES: Clear bilaterally. No periorbital TTP.  Pupils equally round and reactive to light, extraocular movements grossly intact.    CARDIOVASCULAR: Normal rate, regular rhythm.  Heart sounds S1, S2.  No murmurs, rubs or gallops. Normal pulses. Capillary refill <2 sec.   RESPIRATORY: No increased work of breathing. Breath sounds clear and equal bilaterally.  GASTROINTESTINAL: Abdomen soft, non-distended, non-tender. No rebound, no guarding. Bowel sounds normal in all 4 quadrants. No palpable masses.   GENITOURINARY:  No CVA tenderness.  MUSCULOSKELETAL: Skin tear L fifth digit with no bony tenderness.  FROM and SILT throughout.  No midline C/T/L TTP or stepoffs. No other muscle or joint deformity or TTP. No edema.  NEUROLOGICAL: GCS 15. " Alert and oriented, no asymmetry, moving all extremities equally.  SKIN: Warm, dry and intact. No rash. No seatbelt sign, pedroza sign, raccoon eyes or other notable skin lesions except as noted above.   PSYCHIATRIC: Normal mood and affect.  HEME/LYMPH: No adenopathy or splenomegaly.    Diagnostic Results      ECG: ECGs read and interpreted by me. See ED Course, below, for interpretation.    Labs Reviewed - No data to display      CT head wo IV contrast   Final Result   No acute intracranial abnormality.             MACRO:   none        Signed by: Karen Church 11/15/2024 4:37 PM   Dictation workstation:   [id number]      CT cervical spine wo IV contrast   Final Result   No obvious fracture.             MACRO:   None        Signed by: Karen Church 11/15/2024 5:05 PM   Dictation workstation:   [id number]                    [place] Coma Scale Score: 15                  Procedure  Procedures    ED Course & MDM   Assessment/Plan:   Dorothea Huang is a 89 y.o. female with a history of PE on Eliquis, HTN, goiter s/p partial thyroidectomy, HLD, TIA, depression, presents with a fall with head strike.  She wants to get checked because she is on Eliquis.  She denies any headache or loss of consciousness.  The fall was purely mechanical.  She has a skin tear to her left hand and some left shoulder pain but is refusing x-rays at this time.  Workup was initiated with CTs of the head and cervical spine.  She is up-to-date on tetanus.  RN cleaned wounds and applied bacitracin and gauze. See below for details of ED course and ultimate disposition.    Medications   bacitracin ointment 1 Application (has no administration in time range)   bacitracin ointment 1 Application (1 Application Topical Given 11/15/24 1458)        ED Course as of 11/15/24 1844   Fri Nov 15, 2024   1645 CT head negative [CG]   1743 CT CS negative [CG]   1844 Patient was reevaluated.  She was advised of her reassuring workup.  Patient was discharged home with  anticipatory guidance and strict return precautions. [CG]      ED Course User Index  [CG] Chandni Camacho MD         Diagnoses as of 11/15/24 1844   Fall, initial encounter   Head injury, initial encounter   Skin tear of left hand without complication, initial encounter       Disposition:   DISCHARGE.  The patient was discharged with both verbal and written anticipatory guidance and strict return precautions. Discharge diagnosis, instructions and plan were discussed and understood. I emphasized the importance of following up with their primary care provider within 24-48 hours and with any referrals in the timeframe recommended. At the time of discharge the patient was comfortable and was in no apparent distress. Patient is aware of diagnostic uncertainty and was notified though testing is negative here, there is a very small chance that pathology may be missed.  The patient understands these risks and the patient/family understood to call 911 or return immediately to the emergency department if the symptoms worsen or if they have any additional concerns.      FOLLOW UP  Primary care provider in 1-2 days.      ED Prescriptions    None         Chandni Camacho MD  EM/IM/Peds    This note was dictated by speech recognition. Minor errors in transcription may be present.     Chandni Camacho MD  11/15/24 [id number]

## 2024-11-21 PROCEDURE — 83880 ASSAY OF NATRIURETIC PEPTIDE: CPT

## 2024-11-21 PROCEDURE — 85027 COMPLETE CBC AUTOMATED: CPT

## 2024-12-03 ENCOUNTER — LAB REQUISITION (OUTPATIENT)
Dept: LAB | Facility: LAB | Age: 89
End: 2024-12-03
Payer: MEDICARE

## 2024-12-03 DIAGNOSIS — N18.9 CHRONIC KIDNEY DISEASE, UNSPECIFIED: ICD-10-CM

## 2024-12-03 DIAGNOSIS — E04.9 NONTOXIC GOITER, UNSPECIFIED: ICD-10-CM

## 2024-12-03 DIAGNOSIS — M81.0 AGE-RELATED OSTEOPOROSIS WITHOUT CURRENT PATHOLOGICAL FRACTURE: ICD-10-CM

## 2024-12-03 DIAGNOSIS — E55.9 VITAMIN D DEFICIENCY, UNSPECIFIED: ICD-10-CM

## 2024-12-03 DIAGNOSIS — E79.0 HYPERURICEMIA WITHOUT SIGNS OF INFLAMMATORY ARTHRITIS AND TOPHACEOUS DISEASE: ICD-10-CM

## 2024-12-03 DIAGNOSIS — I10 ESSENTIAL (PRIMARY) HYPERTENSION: ICD-10-CM

## 2024-12-03 LAB
25(OH)D3 SERPL-MCNC: 45 NG/ML (ref 30–100)
ALBUMIN SERPL BCP-MCNC: 4.2 G/DL (ref 3.4–5)
ALP SERPL-CCNC: 60 U/L (ref 33–136)
ALT SERPL W P-5'-P-CCNC: 17 U/L (ref 7–45)
ANION GAP SERPL CALC-SCNC: 15 MMOL/L (ref 10–20)
AST SERPL W P-5'-P-CCNC: 20 U/L (ref 9–39)
BILIRUB SERPL-MCNC: 1 MG/DL (ref 0–1.2)
BUN SERPL-MCNC: 30 MG/DL (ref 6–23)
CALCIUM SERPL-MCNC: 9.8 MG/DL (ref 8.6–10.6)
CHLORIDE SERPL-SCNC: 105 MMOL/L (ref 98–107)
CHOLEST SERPL-MCNC: 246 MG/DL (ref 0–199)
CHOLESTEROL/HDL RATIO: 4.6
CO2 SERPL-SCNC: 24 MMOL/L (ref 21–32)
CREAT SERPL-MCNC: 1.44 MG/DL (ref 0.5–1.05)
EGFRCR SERPLBLD CKD-EPI 2021: 35 ML/MIN/1.73M*2
ERYTHROCYTE [DISTWIDTH] IN BLOOD BY AUTOMATED COUNT: 15.1 % (ref 11.5–14.5)
GLUCOSE SERPL-MCNC: 117 MG/DL (ref 74–99)
HCT VFR BLD AUTO: 36.5 % (ref 36–46)
HDLC SERPL-MCNC: 53.3 MG/DL
HGB BLD-MCNC: 11.7 G/DL (ref 12–16)
LDLC SERPL CALC-MCNC: 162 MG/DL
MCH RBC QN AUTO: 33.8 PG (ref 26–34)
MCHC RBC AUTO-ENTMCNC: 32.1 G/DL (ref 32–36)
MCV RBC AUTO: 106 FL (ref 80–100)
NON HDL CHOLESTEROL: 193 MG/DL (ref 0–149)
NRBC BLD-RTO: 0 /100 WBCS (ref 0–0)
PLATELET # BLD AUTO: 342 X10*3/UL (ref 150–450)
POTASSIUM SERPL-SCNC: 4.5 MMOL/L (ref 3.5–5.3)
PROT SERPL-MCNC: 6.8 G/DL (ref 6.4–8.2)
RBC # BLD AUTO: 3.46 X10*6/UL (ref 4–5.2)
SODIUM SERPL-SCNC: 139 MMOL/L (ref 136–145)
T4 FREE SERPL-MCNC: 1.39 NG/DL (ref 0.78–1.48)
TRIGL SERPL-MCNC: 156 MG/DL (ref 0–149)
TSH SERPL-ACNC: 2.46 MIU/L (ref 0.44–3.98)
URATE SERPL-MCNC: 4.3 MG/DL (ref 2.3–6.7)
VLDL: 31 MG/DL (ref 0–40)
WBC # BLD AUTO: 7.7 X10*3/UL (ref 4.4–11.3)

## 2024-12-03 PROCEDURE — 80061 LIPID PANEL: CPT

## 2024-12-03 PROCEDURE — 85027 COMPLETE CBC AUTOMATED: CPT

## 2024-12-03 PROCEDURE — 84439 ASSAY OF FREE THYROXINE: CPT

## 2024-12-03 PROCEDURE — 84550 ASSAY OF BLOOD/URIC ACID: CPT

## 2024-12-03 PROCEDURE — 80053 COMPREHEN METABOLIC PANEL: CPT

## 2024-12-03 PROCEDURE — 84443 ASSAY THYROID STIM HORMONE: CPT

## 2024-12-03 PROCEDURE — 82306 VITAMIN D 25 HYDROXY: CPT

## 2024-12-06 ENCOUNTER — HOSPITAL ENCOUNTER (OUTPATIENT)
Dept: CARDIOLOGY | Facility: HOSPITAL | Age: 89
Discharge: HOME | End: 2024-12-06
Payer: MEDICARE

## 2024-12-06 VITALS — WEIGHT: 180 LBS | HEIGHT: 64 IN | BODY MASS INDEX: 30.73 KG/M2

## 2024-12-06 DIAGNOSIS — R06.09 DYSPNEA ON EXERTION: ICD-10-CM

## 2024-12-06 DIAGNOSIS — R06.02 SHORT OF BREATH ON EXERTION: ICD-10-CM

## 2024-12-06 PROCEDURE — 93306 TTE W/DOPPLER COMPLETE: CPT

## 2024-12-07 LAB
AORTIC VALVE MEAN GRADIENT: 14 MMHG
AORTIC VALVE PEAK VELOCITY: 2.51 M/S
AV PEAK GRADIENT: 25 MMHG
AVA (PEAK VEL): 1.68 CM2
AVA (VTI): 1.74 CM2
EJECTION FRACTION APICAL 4 CHAMBER: 74.9
EJECTION FRACTION: 73 %
LEFT ATRIUM VOLUME AREA LENGTH INDEX BSA: 32.9 ML/M2
LEFT VENTRICLE INTERNAL DIMENSION DIASTOLE: 4.21 CM (ref 3.5–6)
LEFT VENTRICULAR OUTFLOW TRACT DIAMETER: 1.96 CM
MITRAL VALVE E/A RATIO: 0.84
RIGHT VENTRICLE FREE WALL PEAK S': 13 CM/S
RIGHT VENTRICLE PEAK SYSTOLIC PRESSURE: 23.1 MMHG
TRICUSPID ANNULAR PLANE SYSTOLIC EXCURSION: 2.1 CM

## 2024-12-10 ENCOUNTER — HOSPITAL ENCOUNTER (OUTPATIENT)
Dept: RADIOLOGY | Facility: CLINIC | Age: 89
Discharge: HOME | End: 2024-12-10
Payer: MEDICARE

## 2024-12-10 DIAGNOSIS — I27.82 OTHER CHRONIC PULMONARY EMBOLISM, UNSPECIFIED WHETHER ACUTE COR PULMONALE PRESENT (MULTI): ICD-10-CM

## 2024-12-10 DIAGNOSIS — R79.89 ELEVATED BRAIN NATRIURETIC PEPTIDE (BNP) LEVEL: ICD-10-CM

## 2024-12-10 DIAGNOSIS — I26.99 ACUTE PULMONARY EMBOLISM, UNSPECIFIED PULMONARY EMBOLISM TYPE, UNSPECIFIED WHETHER ACUTE COR PULMONALE PRESENT (MULTI): ICD-10-CM

## 2024-12-10 PROCEDURE — 71046 X-RAY EXAM CHEST 2 VIEWS: CPT | Performed by: RADIOLOGY

## 2024-12-10 PROCEDURE — 71046 X-RAY EXAM CHEST 2 VIEWS: CPT

## 2024-12-10 PROCEDURE — 72070 X-RAY EXAM THORAC SPINE 2VWS: CPT

## 2025-01-03 ENCOUNTER — APPOINTMENT (OUTPATIENT)
Dept: ORTHOPEDIC SURGERY | Facility: CLINIC | Age: 89
End: 2025-01-03
Payer: MEDICARE

## 2025-01-13 ENCOUNTER — OFFICE VISIT (OUTPATIENT)
Dept: ORTHOPEDIC SURGERY | Facility: HOSPITAL | Age: 89
End: 2025-01-13
Payer: MEDICARE

## 2025-01-13 ENCOUNTER — HOSPITAL ENCOUNTER (OUTPATIENT)
Dept: RADIOLOGY | Facility: EXTERNAL LOCATION | Age: 89
Discharge: HOME | End: 2025-01-13

## 2025-01-13 DIAGNOSIS — M25.511 PAIN OF BOTH SHOULDER JOINTS: ICD-10-CM

## 2025-01-13 DIAGNOSIS — M25.512 PAIN OF BOTH SHOULDER JOINTS: ICD-10-CM

## 2025-01-13 DIAGNOSIS — M12.811 ROTATOR CUFF ARTHROPATHY OF RIGHT SHOULDER: Primary | ICD-10-CM

## 2025-01-13 PROCEDURE — 99214 OFFICE O/P EST MOD 30 MIN: CPT | Mod: 25 | Performed by: EMERGENCY MEDICINE

## 2025-01-13 PROCEDURE — 1159F MED LIST DOCD IN RCRD: CPT | Performed by: EMERGENCY MEDICINE

## 2025-01-13 PROCEDURE — 2500000004 HC RX 250 GENERAL PHARMACY W/ HCPCS (ALT 636 FOR OP/ED): Performed by: EMERGENCY MEDICINE

## 2025-01-13 PROCEDURE — 99214 OFFICE O/P EST MOD 30 MIN: CPT | Performed by: EMERGENCY MEDICINE

## 2025-01-13 PROCEDURE — 20611 DRAIN/INJ JOINT/BURSA W/US: CPT | Mod: RT | Performed by: EMERGENCY MEDICINE

## 2025-01-13 RX ORDER — TRIAMCINOLONE ACETONIDE 40 MG/ML
80 INJECTION, SUSPENSION INTRA-ARTICULAR; INTRAMUSCULAR
Status: COMPLETED | OUTPATIENT
Start: 2025-01-13 | End: 2025-01-13

## 2025-01-13 RX ORDER — LIDOCAINE HYDROCHLORIDE 10 MG/ML
3 INJECTION, SOLUTION INFILTRATION; PERINEURAL
Status: COMPLETED | OUTPATIENT
Start: 2025-01-13 | End: 2025-01-13

## 2025-01-13 RX ADMIN — LIDOCAINE HYDROCHLORIDE 3 ML: 10 INJECTION, SOLUTION INFILTRATION; PERINEURAL at 10:05

## 2025-01-13 RX ADMIN — TRIAMCINOLONE ACETONIDE 80 MG: 200 INJECTION, SUSPENSION INTRA-ARTICULAR; INTRAMUSCULAR at 10:05

## 2025-01-13 NOTE — PROGRESS NOTES
Subjective   Dorothea Huang is a 89 y.o. female who presents for Follow-up of the Right Shoulder (DOLI-10/11/24) and Follow-up of the Left Shoulder (DOLI-10/11/24)    Left Shoulder     Right Shoulder       1/13/25: Patient returns today for bilateral shoulder pain.  We did perform bilateral shoulder glenohumeral joint corticosteroid injections for her on 10/11/2024.  She does feel that her left shoulder is doing okay, however her right shoulder has become progressively worse and more painful.  She has seen Dr. Gutierrez in the past for consideration of a total shoulder replacement and may want to consider having another discussion in the near future.  In the meantime, she is requesting a right shoulder repeat corticosteroid injection.    10/11/24: Patient returns today for bilateral shoulder pain.  We did perform bilateral shoulder glenohumeral joint corticosteroid injections for her on 7/19/2024.  She felt these worked quite well up until recently.  Her pain is since returned to become progressively worse.  Considering this, she would like to have repeat injections performed today.  No additional complaints.    7/19/24: Patient returns today for bilateral shoulder pain.  We did perform bilateral shoulder glenohumeral joint injections for her on 4/12/24.  She felt they worked well and her pain has since returned and has become progressively worse.  Considering this, she would like to have repeat injections performed today.  No additional complaints.    4/12/24: Patient returns today for bilateral shoulder pain.  We did perform bilateral shoulder glenohumeral joint injections for her on 1/18/2024.  She felt they worked well and her pain has since returned and has become progressively worse.  Considering this, she would like to have repeat injections performed today.  No additional complaints.    1/18/24: Patient returns today for bilateral shoulder pain.  Her last left shoulder glenohumeral joint injection was  performed on 7/27/2023 and her last right shoulder subacromial injection was performed on 10/20/2023.  She felt that the subacromial injection did not provide significant relief.    10/20/23: Patient returns today for bilateral shoulder pain.  During her last visit on 7/27/2023, we did perform a right subacromial injection and a left glenohumeral joint injection.  She feels that the right subacromial injection was quite beneficial for her up until recently.  She would like to have a repeat injection performed today.  Her left shoulder remains pain-free at this time.    7/27/23: Patient returns today for bilateral shoulder pain.  During her last visit on 5/8/2023, we performed a right subacromial injection.  She felt that these worked quite well for her up until recently.  She would like to have repeat injections performed today.  She denies any further injuries.  She has no additional complaints this time.    5/8/23: Patient returns today for bilateral shoulder pain.  We did perform a right shoulder subacromial injection on 1/23/2023 that she felt worked better for her right shoulder than the glenohumeral joint injection.  She states the left shoulder glenohumeral joint injection she received on 1/9/2023 worked quite well up until recently.  She would like to have both shoulders injected today.    1/23/23: Patient returns today for right shoulder pain.  We did perform bilateral shoulder glenohumeral joint injections on 1/9/2023.  She feels that her left shoulder is doing well.  However, she continues to have superior shoulder pain.  This pain is exacerbated by laying on her shoulder, eating objects, and overhead activities.  She would like to discuss potential further treatment options.    1/9/23: Patient turns today for bilateral shoulder pain.  She states the previous injections she received on 10/17/2022 worked quite well up until recently.  She presents today requesting repeat injections.  She denies any further  injuries.  She has no additional complaints at this time.    10/17/22: Patient returns today for bilateral shoulder pain.  She states the previous injections she received on 7/14/2022 worked quite well for her up until recently.  She presents today requesting repeat injections.  She denies any additional injuries.    7/14/22: Patient returns today for reevaluation of right shoulder pain, as well as new complaint of left shoulder pain.  During her last visit on 2/14/2022, we did perform a right shoulder glenohumeral injection which worked quite well for her up until recently.  She would like to have a repeat injection today.  Furthermore, she is also developing worsening left shoulder pain for the past few weeks as well.  She did have left shoulder injection performed on 11/19/2021.  She would like a left shoulder injection today.    2/14/22: 86-year-old female presents today with complaint of generalized and superior right shoulder pain that she has had for right sometime, however exacerbated after an injury in April 2021.  She has been having therapeutic corticosteroid injections with Dr. Gutierrez.  She presents today requesting a repeat injection.  She denies any further injuries.  ROS: All pertinent positive symptoms are included in the history of present illness.    All other systems have been reviewed and are negative and noncontributory to this patient's current ailments.    Objective     There were no vitals filed for this visit.    Physical Exam    General/Constitutional: No apparent distress. Well-nourished and well developed.  Head: Normocephalic, Atraumatic.   Eyes: EOMI.  Vascular: No edema, swelling or tenderness, except as noted in detailed exam.  Respiratory: Non-labored breathing.  Integumentary: No impressive skin lesions present, except as noted in detailed exam.  Neurological: Alert and oriented.  Psychological:  Normal mood and affect.  Musculoskeletal: Normal, except as noted in detailed  exam.  Right shoulder: No rash.  No deformity.  No erythema.  Generalized pain with passive range of motion throughout all planes.  Positive empty can.  Positive Neer.  Muscle strength 4 out of 5 with forward flexion and external rotation.  Left shoulder: No rash. No deformity. No erythema. Generalized pain with passive range of motion throughout all planes.       Patient ID: Dorothea Huang is a 89 y.o. female.    L Inj/Asp: R glenohumeral on 1/13/2025 10:05 AM  Indications: pain  Details: 25 G needle, ultrasound-guided posterior approach  Medications: 80 mg triamcinolone acetonide 40 mg/mL; 3 mL lidocaine 10 mg/mL (1 %)  Outcome: tolerated well, no immediate complications  Procedure, treatment alternatives, risks and benefits explained, specific risks discussed. Consent was given by the patient. Immediately prior to procedure a time out was called to verify the correct patient, procedure, equipment, support staff and site/side marked as required. Patient was prepped and draped in the usual sterile fashion.           Assessment/Plan   Problem List Items Addressed This Visit    None  Visit Diagnoses       Pain of both shoulder joints        Relevant Orders    Point of Care Ultrasound (Completed)            Considering that she has done well with previous injections, I do feel that a repeat right shoulder corticosteroid injection is warranted today.  Discussed risks versus benefits of having injection performed. Patient has elected to proceed with procedure. Please refer to procedure note above. Discussed with patient to limit overhead and lifting activities over the next 24-48 hours, they can then progress to full activities as tolerated. Discussed with patient to avoid water submersion over the next 2 days. Discussed with patient to call me immediately if they develop worsening pain, rash, erythema, or fevers.  In the meantime, she will follow-up with Dr. Gutierrez to discuss surgical options.  We did discuss that  she cannot have a total shoulder replacement within 3 months of an injection.  She verbalized understanding.    Oliverio Valdez,   Sports Medicine  [facility]     ** Please excuse any errors in grammar or translation related to this dictation. Voice recognition software was utilized to prepare this document. **

## 2025-02-28 ENCOUNTER — HOSPITAL ENCOUNTER (OUTPATIENT)
Dept: RADIOLOGY | Facility: CLINIC | Age: 89
Discharge: HOME | End: 2025-02-28
Payer: MEDICARE

## 2025-02-28 VITALS — WEIGHT: 180 LBS | BODY MASS INDEX: 30.73 KG/M2 | HEIGHT: 64 IN

## 2025-02-28 DIAGNOSIS — Z12.31 VISIT FOR SCREENING MAMMOGRAM: ICD-10-CM

## 2025-02-28 PROCEDURE — 77063 BREAST TOMOSYNTHESIS BI: CPT

## 2025-04-18 ENCOUNTER — APPOINTMENT (OUTPATIENT)
Dept: ORTHOPEDIC SURGERY | Facility: CLINIC | Age: 89
End: 2025-04-18
Payer: MEDICARE

## 2025-04-21 ENCOUNTER — APPOINTMENT (OUTPATIENT)
Dept: ORTHOPEDIC SURGERY | Facility: CLINIC | Age: 89
End: 2025-04-21
Payer: MEDICARE

## 2025-04-21 ENCOUNTER — HOSPITAL ENCOUNTER (OUTPATIENT)
Dept: RADIOLOGY | Facility: EXTERNAL LOCATION | Age: 89
Discharge: HOME | End: 2025-04-21

## 2025-04-21 DIAGNOSIS — M12.811 ROTATOR CUFF ARTHROPATHY OF RIGHT SHOULDER: Primary | ICD-10-CM

## 2025-04-21 PROCEDURE — 99214 OFFICE O/P EST MOD 30 MIN: CPT | Mod: 25 | Performed by: EMERGENCY MEDICINE

## 2025-04-21 PROCEDURE — 99214 OFFICE O/P EST MOD 30 MIN: CPT | Performed by: EMERGENCY MEDICINE

## 2025-04-21 PROCEDURE — 20611 DRAIN/INJ JOINT/BURSA W/US: CPT | Mod: RT | Performed by: EMERGENCY MEDICINE

## 2025-04-21 PROCEDURE — 2500000004 HC RX 250 GENERAL PHARMACY W/ HCPCS (ALT 636 FOR OP/ED): Performed by: EMERGENCY MEDICINE

## 2025-04-21 RX ORDER — LIDOCAINE HYDROCHLORIDE 10 MG/ML
3 INJECTION, SOLUTION INFILTRATION; PERINEURAL
Status: COMPLETED | OUTPATIENT
Start: 2025-04-21 | End: 2025-04-21

## 2025-04-21 RX ORDER — TRIAMCINOLONE ACETONIDE 40 MG/ML
80 INJECTION, SUSPENSION INTRA-ARTICULAR; INTRAMUSCULAR
Status: COMPLETED | OUTPATIENT
Start: 2025-04-21 | End: 2025-04-21

## 2025-04-21 RX ADMIN — TRIAMCINOLONE ACETONIDE 80 MG: 400 INJECTION, SUSPENSION INTRA-ARTICULAR; INTRAMUSCULAR at 11:01

## 2025-04-21 RX ADMIN — LIDOCAINE HYDROCHLORIDE 3 ML: 10 INJECTION, SOLUTION INFILTRATION; PERINEURAL at 11:01

## 2025-04-21 NOTE — PROGRESS NOTES
Subjective   Dorothea Huang is a 89 y.o. female who presents for Follow-up of the Right Shoulder    Left Shoulder     Right Shoulder       4/21/25: Patient returns today with complaint of right shoulder pain.  I did perform a glenohumeral joint corticosteroid injection for her on 1/13/2025.  She felt this worked well up until recently.  Her pain is since returned and become progressively worse.  Considering this, she would like to have a repeat glenohumeral joint injection today.  No additional complaints at this time.    1/13/25: Patient returns today for bilateral shoulder pain.  We did perform bilateral shoulder glenohumeral joint corticosteroid injections for her on 10/11/2024.  She does feel that her left shoulder is doing okay, however her right shoulder has become progressively worse and more painful.  She has seen Dr. Gutierrez in the past for consideration of a total shoulder replacement and may want to consider having another discussion in the near future.  In the meantime, she is requesting a right shoulder repeat corticosteroid injection.    10/11/24: Patient returns today for bilateral shoulder pain.  We did perform bilateral shoulder glenohumeral joint corticosteroid injections for her on 7/19/2024.  She felt these worked quite well up until recently.  Her pain is since returned to become progressively worse.  Considering this, she would like to have repeat injections performed today.  No additional complaints.    7/19/24: Patient returns today for bilateral shoulder pain.  We did perform bilateral shoulder glenohumeral joint injections for her on 4/12/24.  She felt they worked well and her pain has since returned and has become progressively worse.  Considering this, she would like to have repeat injections performed today.  No additional complaints.    4/12/24: Patient returns today for bilateral shoulder pain.  We did perform bilateral shoulder glenohumeral joint injections for her on 1/18/2024.   She felt they worked well and her pain has since returned and has become progressively worse.  Considering this, she would like to have repeat injections performed today.  No additional complaints.    1/18/24: Patient returns today for bilateral shoulder pain.  Her last left shoulder glenohumeral joint injection was performed on 7/27/2023 and her last right shoulder subacromial injection was performed on 10/20/2023.  She felt that the subacromial injection did not provide significant relief.    10/20/23: Patient returns today for bilateral shoulder pain.  During her last visit on 7/27/2023, we did perform a right subacromial injection and a left glenohumeral joint injection.  She feels that the right subacromial injection was quite beneficial for her up until recently.  She would like to have a repeat injection performed today.  Her left shoulder remains pain-free at this time.    7/27/23: Patient returns today for bilateral shoulder pain.  During her last visit on 5/8/2023, we performed a right subacromial injection.  She felt that these worked quite well for her up until recently.  She would like to have repeat injections performed today.  She denies any further injuries.  She has no additional complaints this time.    5/8/23: Patient returns today for bilateral shoulder pain.  We did perform a right shoulder subacromial injection on 1/23/2023 that she felt worked better for her right shoulder than the glenohumeral joint injection.  She states the left shoulder glenohumeral joint injection she received on 1/9/2023 worked quite well up until recently.  She would like to have both shoulders injected today.    1/23/23: Patient returns today for right shoulder pain.  We did perform bilateral shoulder glenohumeral joint injections on 1/9/2023.  She feels that her left shoulder is doing well.  However, she continues to have superior shoulder pain.  This pain is exacerbated by laying on her shoulder, eating objects, and  overhead activities.  She would like to discuss potential further treatment options.    1/9/23: Patient turns today for bilateral shoulder pain.  She states the previous injections she received on 10/17/2022 worked quite well up until recently.  She presents today requesting repeat injections.  She denies any further injuries.  She has no additional complaints at this time.    10/17/22: Patient returns today for bilateral shoulder pain.  She states the previous injections she received on 7/14/2022 worked quite well for her up until recently.  She presents today requesting repeat injections.  She denies any additional injuries.    7/14/22: Patient returns today for reevaluation of right shoulder pain, as well as new complaint of left shoulder pain.  During her last visit on 2/14/2022, we did perform a right shoulder glenohumeral injection which worked quite well for her up until recently.  She would like to have a repeat injection today.  Furthermore, she is also developing worsening left shoulder pain for the past few weeks as well.  She did have left shoulder injection performed on 11/19/2021.  She would like a left shoulder injection today.    2/14/22: 86-year-old female presents today with complaint of generalized and superior right shoulder pain that she has had for right sometime, however exacerbated after an injury in April 2021.  She has been having therapeutic corticosteroid injections with Dr. Gutierrez.  She presents today requesting a repeat injection.  She denies any further injuries.  ROS: All pertinent positive symptoms are included in the history of present illness.    All other systems have been reviewed and are negative and noncontributory to this patient's current ailments.    Objective     There were no vitals filed for this visit.    Physical Exam    General/Constitutional: No apparent distress. Well-nourished and well developed.  Head: Normocephalic, Atraumatic.   Eyes: EOMI.  Vascular: No  edema, swelling or tenderness, except as noted in detailed exam.  Respiratory: Non-labored breathing.  Integumentary: No impressive skin lesions present, except as noted in detailed exam.  Neurological: Alert and oriented.  Psychological:  Normal mood and affect.  Musculoskeletal: Normal, except as noted in detailed exam.  Right shoulder: No rash.  No deformity.  No erythema.  Generalized pain with passive range of motion throughout all planes.  Positive empty can.  Positive Neer.  Muscle strength 4 out of 5 with forward flexion and external rotation.  Left shoulder: No rash. No deformity. No erythema. Generalized pain with passive range of motion throughout all planes.       Patient ID: Dorothea Huang is a 89 y.o. female.    L Inj/Asp: R glenohumeral on 4/21/2025 11:01 AM  Indications: pain  Details: 25 G needle, ultrasound-guided posterior approach  Medications: 80 mg triamcinolone acetonide 40 mg/mL; 3 mL lidocaine 10 mg/mL (1 %)  Outcome: tolerated well, no immediate complications  Procedure, treatment alternatives, risks and benefits explained, specific risks discussed. Consent was given by the patient. Immediately prior to procedure a time out was called to verify the correct patient, procedure, equipment, support staff and site/side marked as required. Patient was prepped and draped in the usual sterile fashion.           Assessment/Plan   Problem List Items Addressed This Visit    None  Visit Diagnoses         Rotator cuff arthropathy of right shoulder        Relevant Orders    Point of Care Ultrasound (Completed)            Considering that she has done well with previous injections, I do feel that a repeat right shoulder corticosteroid injection is warranted today.  Discussed risks versus benefits of having injection performed. Patient has elected to proceed with procedure. Please refer to procedure note above. Discussed with patient to limit overhead and lifting activities over the next 24-48 hours, they  can then progress to full activities as tolerated. Discussed with patient to avoid water submersion over the next 2 days. Discussed with patient to call me immediately if they develop worsening pain, rash, erythema, or fevers.  In the meantime, she will follow-up with Dr. Gutierrez to discuss surgical options.  We did discuss that she cannot have a total shoulder replacement within 3 months of an injection.  She verbalized understanding.  We did also discuss the option of viscosupplementation injection therapy in the future if she would like to try this.  We discussed the process and that it would be an out-of-pocket cost.  In the meantime, she would like to continue with corticosteroid injection therapy.    Oliverio Valdez, DO  Sports Medicine  [facility]     ** Please excuse any errors in grammar or translation related to this dictation. Voice recognition software was utilized to prepare this document. **

## 2025-06-17 ENCOUNTER — TELEPHONE (OUTPATIENT)
Dept: ORTHOPEDIC SURGERY | Facility: HOSPITAL | Age: 89
End: 2025-06-17
Payer: MEDICARE

## 2025-06-17 NOTE — TELEPHONE ENCOUNTER
Returned patient's call regarding her shoulder. I told her that Dr. Valdez is out of the office for the rest of the week. She states she was fine with keeping her upcoming appointment on 7/21/25. Patient also asked that I cancel her appointment for the left shoulder on 7/14/25.

## 2025-07-14 ENCOUNTER — APPOINTMENT (OUTPATIENT)
Dept: ORTHOPEDIC SURGERY | Facility: HOSPITAL | Age: 89
End: 2025-07-14
Payer: MEDICARE

## 2025-07-21 ENCOUNTER — HOSPITAL ENCOUNTER (OUTPATIENT)
Dept: RADIOLOGY | Facility: EXTERNAL LOCATION | Age: 89
Discharge: HOME | End: 2025-07-21

## 2025-07-21 ENCOUNTER — OFFICE VISIT (OUTPATIENT)
Dept: ORTHOPEDIC SURGERY | Facility: HOSPITAL | Age: 89
End: 2025-07-21
Payer: MEDICARE

## 2025-07-21 DIAGNOSIS — M12.811 ROTATOR CUFF ARTHROPATHY OF RIGHT SHOULDER: Primary | ICD-10-CM

## 2025-07-21 PROCEDURE — 20611 DRAIN/INJ JOINT/BURSA W/US: CPT | Mod: RT | Performed by: EMERGENCY MEDICINE

## 2025-07-21 PROCEDURE — 1159F MED LIST DOCD IN RCRD: CPT | Performed by: EMERGENCY MEDICINE

## 2025-07-21 PROCEDURE — 99213 OFFICE O/P EST LOW 20 MIN: CPT | Performed by: EMERGENCY MEDICINE

## 2025-07-21 PROCEDURE — 2500000004 HC RX 250 GENERAL PHARMACY W/ HCPCS (ALT 636 FOR OP/ED): Performed by: EMERGENCY MEDICINE

## 2025-07-21 PROCEDURE — 99213 OFFICE O/P EST LOW 20 MIN: CPT | Mod: 25 | Performed by: EMERGENCY MEDICINE

## 2025-07-21 RX ORDER — LIDOCAINE HYDROCHLORIDE 10 MG/ML
3 INJECTION, SOLUTION INFILTRATION; PERINEURAL
Status: COMPLETED | OUTPATIENT
Start: 2025-07-21 | End: 2025-07-21

## 2025-07-21 RX ORDER — TRIAMCINOLONE ACETONIDE 40 MG/ML
80 INJECTION, SUSPENSION INTRA-ARTICULAR; INTRAMUSCULAR
Status: COMPLETED | OUTPATIENT
Start: 2025-07-21 | End: 2025-07-21

## 2025-07-21 RX ADMIN — TRIAMCINOLONE ACETONIDE 80 MG: 200 INJECTION, SUSPENSION INTRA-ARTICULAR; INTRAMUSCULAR at 12:43

## 2025-07-21 RX ADMIN — LIDOCAINE HYDROCHLORIDE 3 ML: 10 INJECTION, SOLUTION INFILTRATION; PERINEURAL at 12:43

## 2025-07-21 ASSESSMENT — PAIN SCALES - GENERAL: PAINLEVEL_OUTOF10: 5 - MODERATE PAIN

## 2025-07-21 ASSESSMENT — PAIN - FUNCTIONAL ASSESSMENT: PAIN_FUNCTIONAL_ASSESSMENT: 0-10

## 2025-07-21 NOTE — PROGRESS NOTES
Subjective   Patient ID: Dorothea Huang is a 89 y.o. female who presents for Pain of the Right Shoulder.  History of Present Illness  The patient returns today with a complaint of right shoulder pain. She has known right shoulder rotator cuff arthropathy and glenohumeral arthritis. A right glenohumeral joint corticosteroid injection was performed on 04/21/2025, which provided relief until recently. She is interested in receiving another injection today.    She reports experiencing pain in her right shoulder.    Additionally, she mentions having carpal tunnel syndrome in both hands and plans to consult Dr. Noriega tomorrow. She anticipates that he may suggest a cortisone injection.    All other systems have been reviewed and are negative for complaint.      Objective     There were no vitals taken for this visit.     Physical Exam  - Musculoskeletal:  Right shoulder: No rash.  No deformity.  No erythema.  Generalized pain with passive range of motion throughout all planes.  Positive empty can.  Positive Neer.  Muscle strength 4 out of 5 with forward flexion and external rotation.  Left shoulder: No rash. No deformity. No erythema. Generalized pain with passive range of motion throughout all planes.       Patient ID: Dorothea Huang is a 89 y.o. female.    L Inj/Asp: R glenohumeral on 7/21/2025 12:43 PM  Indications: pain  Details: 25 G needle, ultrasound-guided posterior approach  Medications: 80 mg triamcinolone acetonide 40 mg/mL; 3 mL lidocaine 10 mg/mL (1 %)  Outcome: tolerated well, no immediate complications  Procedure, treatment alternatives, risks and benefits explained, specific risks discussed. Consent was given by the patient. Immediately prior to procedure a time out was called to verify the correct patient, procedure, equipment, support staff and site/side marked as required. Patient was prepped and draped in the usual sterile fashion.           1. Rotator cuff arthropathy of right shoulder  Point of  Care Ultrasound          Assessment & Plan  1. Right shoulder pain  - Known right shoulder rotator cuff arthropathy and glenohumeral arthritis  - Previous right glenohumeral joint corticosteroid injection on 04/21/2025 provided relief until recently  - Repeat right shoulder glenohumeral joint injection administered today  - Advised to avoid submerging the area in water for 2 days; showers are permissible, but hot tubs and bathtubs should be avoided to prevent infection      PROCEDURE    Right shoulder glenohumeral joint injection was performed today.    Bharat Valdez DO         This medical note was created with the assistance of artificial intelligence (AI) for documentation purposes. The content has been reviewed and confirmed by the healthcare provider for accuracy and completeness. Patient consented to the use of audio recording and use of AI during their visit.

## 2025-07-21 NOTE — PROGRESS NOTES
[facility]  Hand and Upper Extremity Service  Follow up visit         Follow up for: Bilateral hands    Interval History: She returns for her bilateral hands. She has a longstanding history of left worse than right hand numbness and tingling. This frequently bothers her at night while sleeping, causing sleep disturbances. She does not seem to have any particular symptoms during the day. She has been wearing wrist braces at night and during the day but she does not think they are helping. She has not had any significant symptoms in the last 2-3 days.               Past medical history, medications, allergies, surgical history and review of systems are reviewed and otherwise unchanged when compared to last visit on 6/24/24         Examination:  Constitutional: Oriented to person, place, and time.  Appears well-developed and well-nourished.  Head: Normocephalic and atraumatic.  Eyes: Pupils are equal, round, and reactive to light.  Cardiovascular: Intact distal pulses.  Pulmonary/Chest/Breast: Effort normal. No respiratory distress.  Neurological: Alert and oriented to person, place, and time.  Skin: Skin is warm and dry.  Psychiatric: normal mood and affect.  Behavior is normal.  Musculoskeletal: Bilateral hands reveal arthritic changes. No thenar or intrinsic muscle atrophy. Full finger flexion without triggering of any digit today but has been previously treated for trigger fingers in the past. Diminished sensation median nerve distribution, left worse than right. Positive Durkan median nerve compression test and positive Phalen's test bilaterally.       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 Chondrocalcinosis of the TFCC bilaterally.        Impression: Bilateral carpal tunnel syndrome       Plan: We have discussed  treatment options for her and I have offered to give her carpal tunnel injections as I believe this will help her day and night symptoms and may allow her to wear her braces more frequently. She indicates she has no interest in surgery. We have discussed that surgery is a low risk option for her should she get good relief from these injections.       In Office Procedures Performed: Bilateral carpal tunnel injections  Hand / UE Inj/Asp: bilateral carpal tunnel for carpal tunnel syndrome on 7/22/2025 11:13 AM  Indications: pain and therapeutic  Details: 25 G needle, volar approach  Medications (Right): 20 mg triamcinolone acetonide 40 mg/mL; 0.5 mL lidocaine 10 mg/mL (1 %)  Medications (Left): 20 mg triamcinolone acetonide 40 mg/mL; 0.5 mL lidocaine 10 mg/mL (1 %)  Outcome: tolerated well, no immediate complications  Procedure, treatment alternatives, risks and benefits explained, specific risks discussed. Consent was given by the patient. Immediately prior to procedure a time out was called to verify the correct patient, procedure, equipment, support staff and site/side marked as required. Patient was prepped and draped in the usual sterile fashion.       Follow up: As needed              Fady Noriega MD  [facility]  Department of Orthopaedic Surgery  Hand and Upper Extremity Reconstruction    Scribe Attestation  By signing my name below, I, Adarsh Burns , [employer]   attest that this documentation has been prepared under the direction and in the presence of Dr. Fady Noriega.    Dictation performed with the use of voice recognition software.  Syntax and grammatical errors may exist.

## 2025-07-22 ENCOUNTER — OFFICE VISIT (OUTPATIENT)
Dept: ORTHOPEDIC SURGERY | Facility: HOSPITAL | Age: 89
End: 2025-07-22
Payer: MEDICARE

## 2025-07-22 ENCOUNTER — HOSPITAL ENCOUNTER (OUTPATIENT)
Dept: RADIOLOGY | Facility: HOSPITAL | Age: 89
Discharge: HOME | End: 2025-07-22
Payer: MEDICARE

## 2025-07-22 VITALS — BODY MASS INDEX: 30.73 KG/M2 | HEIGHT: 64 IN | WEIGHT: 180 LBS

## 2025-07-22 DIAGNOSIS — M25.532 BILATERAL WRIST PAIN: ICD-10-CM

## 2025-07-22 DIAGNOSIS — M25.531 BILATERAL WRIST PAIN: ICD-10-CM

## 2025-07-22 DIAGNOSIS — G56.03 BILATERAL CARPAL TUNNEL SYNDROME: Primary | ICD-10-CM

## 2025-07-22 PROCEDURE — 73110 X-RAY EXAM OF WRIST: CPT | Mod: 50

## 2025-07-22 PROCEDURE — 1036F TOBACCO NON-USER: CPT | Performed by: ORTHOPAEDIC SURGERY

## 2025-07-22 PROCEDURE — 73110 X-RAY EXAM OF WRIST: CPT | Mod: BILATERAL PROCEDURE | Performed by: RADIOLOGY

## 2025-07-22 PROCEDURE — 1159F MED LIST DOCD IN RCRD: CPT | Performed by: ORTHOPAEDIC SURGERY

## 2025-07-22 PROCEDURE — 20526 THER INJECTION CARP TUNNEL: CPT | Mod: 50 | Performed by: ORTHOPAEDIC SURGERY

## 2025-07-22 PROCEDURE — 2500000004 HC RX 250 GENERAL PHARMACY W/ HCPCS (ALT 636 FOR OP/ED): Performed by: ORTHOPAEDIC SURGERY

## 2025-07-22 PROCEDURE — 99203 OFFICE O/P NEW LOW 30 MIN: CPT | Mod: 25 | Performed by: ORTHOPAEDIC SURGERY

## 2025-07-22 PROCEDURE — 99214 OFFICE O/P EST MOD 30 MIN: CPT | Performed by: ORTHOPAEDIC SURGERY

## 2025-07-22 RX ORDER — TRIAMCINOLONE ACETONIDE 40 MG/ML
20 INJECTION, SUSPENSION INTRA-ARTICULAR; INTRAMUSCULAR
Status: COMPLETED | OUTPATIENT
Start: 2025-07-22 | End: 2025-07-22

## 2025-07-22 RX ORDER — LIDOCAINE HYDROCHLORIDE 10 MG/ML
0.5 INJECTION, SOLUTION INFILTRATION; PERINEURAL
Status: COMPLETED | OUTPATIENT
Start: 2025-07-22 | End: 2025-07-22

## 2025-07-22 RX ADMIN — TRIAMCINOLONE ACETONIDE 20 MG: 40 INJECTION, SUSPENSION INTRA-ARTICULAR; INTRAMUSCULAR at 11:13

## 2025-07-22 RX ADMIN — LIDOCAINE HYDROCHLORIDE 0.5 ML: 10 INJECTION, SOLUTION INFILTRATION; PERINEURAL at 11:13

## 2025-07-22 NOTE — LETTER
July 22, 2025     Obed James MD  [address]  [address]  [facility] [address]    Patient: Dorothea Huang   YOB: 1935   Date of Visit: 7/22/2025       Dear Dr. Obed James MD:    Thank you for referring Dorothea Huang to me for evaluation. Below are my notes for this consultation.  If you have questions, please do not hesitate to call me. I look forward to following your patient along with you.       Sincerely,     Fady Noriega MD      CC: No Recipients  ______________________________________________________________________________________    [facility]  Hand and Upper Extremity Service  Follow up visit         Follow up for: Bilateral hands    Interval History: She returns for her bilateral hands. She has a longstanding history of left worse than right hand numbness and tingling. This frequently bothers her at night while sleeping, causing sleep disturbances. She does not seem to have any particular symptoms during the day. She has been wearing wrist braces at night and during the day but she does not think they are helping. She has not had any significant symptoms in the last 2-3 days.               Past medical history, medications, allergies, surgical history and review of systems are reviewed and otherwise unchanged when compared to last visit on 6/24/24         Examination:  Constitutional: Oriented to person, place, and time.  Appears well-developed and well-nourished.  Head: Normocephalic and atraumatic.  Eyes: Pupils are equal, round, and reactive to light.  Cardiovascular: Intact distal pulses.  Pulmonary/Chest/Breast: Effort normal. No respiratory distress.  Neurological: Alert and oriented to person, place, and time.  Skin: Skin is warm and dry.  Psychiatric: normal mood and affect.  Behavior is normal.  Musculoskeletal: Bilateral hands reveal arthritic changes. No thenar or intrinsic muscle atrophy. Full finger flexion without triggering of  any digit today but has been previously treated for trigger fingers in the past. Diminished sensation median nerve distribution, left worse than right. Positive Durkan median nerve compression test and positive Phalen's test bilaterally.       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 Chondrocalcinosis of the TFCC bilaterally.        Impression: Bilateral carpal tunnel syndrome       Plan: We have discussed treatment options for her and I have offered to give her carpal tunnel injections as I believe this will help her day and night symptoms and may allow her to wear her braces more frequently. She indicates she has no interest in surgery. We have discussed that surgery is a low risk option for her should she get good relief from these injections.       In Office Procedures Performed: Bilateral carpal tunnel injections  Hand / UE Inj/Asp: bilateral carpal tunnel for carpal tunnel syndrome on 7/22/2025 11:13 AM  Indications: pain and therapeutic  Details: 25 G needle, volar approach  Medications (Right): 20 mg triamcinolone acetonide 40 mg/mL; 0.5 mL lidocaine 10 mg/mL (1 %)  Medications (Left): 20 mg triamcinolone acetonide 40 mg/mL; 0.5 mL lidocaine 10 mg/mL (1 %)  Outcome: tolerated well, no immediate complications  Procedure, treatment alternatives, risks and benefits explained, specific risks discussed. Consent was given by the patient. Immediately prior to procedure a time out was called to verify the correct patient, procedure, equipment, support staff and site/side marked as required. Patient was prepped and draped in the usual sterile fashion.       Follow up: As needed              Fady Noriega MD  [facility]  Department of Orthopaedic Surgery  Hand and Upper Extremity Reconstruction    Scribe  Attestation  By signing my name below, I, Jessica Wise   attest that this documentation has been prepared under the direction and in the presence of Dr. Fady Noriega.    Dictation performed with the use of voice recognition software.  Syntax and grammatical errors may exist.

## 2025-10-13 ENCOUNTER — APPOINTMENT (OUTPATIENT)
Dept: ORTHOPEDIC SURGERY | Facility: HOSPITAL | Age: 89
End: 2025-10-13
Payer: MEDICARE

## 2025-10-17 ENCOUNTER — APPOINTMENT (OUTPATIENT)
Dept: ORTHOPEDIC SURGERY | Facility: CLINIC | Age: 89
End: 2025-10-17
Payer: MEDICARE

## 2025-10-31 ENCOUNTER — APPOINTMENT (OUTPATIENT)
Dept: ORTHOPEDIC SURGERY | Facility: CLINIC | Age: 89
End: 2025-10-31
Payer: MEDICARE

## 2026-01-16 ENCOUNTER — APPOINTMENT (OUTPATIENT)
Dept: ORTHOPEDIC SURGERY | Facility: CLINIC | Age: 89
End: 2026-01-16
Payer: MEDICARE

## 2026-01-30 ENCOUNTER — APPOINTMENT (OUTPATIENT)
Dept: ORTHOPEDIC SURGERY | Facility: CLINIC | Age: 89
End: 2026-01-30
Payer: MEDICARE